# Patient Record
Sex: MALE | Race: WHITE | NOT HISPANIC OR LATINO | URBAN - METROPOLITAN AREA
[De-identification: names, ages, dates, MRNs, and addresses within clinical notes are randomized per-mention and may not be internally consistent; named-entity substitution may affect disease eponyms.]

---

## 2018-03-17 ENCOUNTER — INPATIENT (INPATIENT)
Facility: HOSPITAL | Age: 73
LOS: 5 days | Discharge: HOME | DRG: 304 | End: 2018-03-23
Attending: INTERNAL MEDICINE | Admitting: HOSPITALIST
Payer: MEDICARE

## 2018-03-17 VITALS
DIASTOLIC BLOOD PRESSURE: 135 MMHG | TEMPERATURE: 99 F | HEART RATE: 94 BPM | OXYGEN SATURATION: 96 % | SYSTOLIC BLOOD PRESSURE: 235 MMHG | RESPIRATION RATE: 18 BRPM

## 2018-03-17 DIAGNOSIS — L97.509 NON-PRESSURE CHRONIC ULCER OF OTHER PART OF UNSPECIFIED FOOT WITH UNSPECIFIED SEVERITY: ICD-10-CM

## 2018-03-17 DIAGNOSIS — I25.10 ATHEROSCLEROTIC HEART DISEASE OF NATIVE CORONARY ARTERY WITHOUT ANGINA PECTORIS: ICD-10-CM

## 2018-03-17 DIAGNOSIS — R65.10 SYSTEMIC INFLAMMATORY RESPONSE SYNDROME (SIRS) OF NON-INFECTIOUS ORIGIN WITHOUT ACUTE ORGAN DYSFUNCTION: ICD-10-CM

## 2018-03-17 DIAGNOSIS — Z29.9 ENCOUNTER FOR PROPHYLACTIC MEASURES, UNSPECIFIED: ICD-10-CM

## 2018-03-17 DIAGNOSIS — Z95.1 PRESENCE OF AORTOCORONARY BYPASS GRAFT: Chronic | ICD-10-CM

## 2018-03-17 DIAGNOSIS — Z98.890 OTHER SPECIFIED POSTPROCEDURAL STATES: Chronic | ICD-10-CM

## 2018-03-17 DIAGNOSIS — I16.0 HYPERTENSIVE URGENCY: ICD-10-CM

## 2018-03-17 DIAGNOSIS — I48.91 UNSPECIFIED ATRIAL FIBRILLATION: ICD-10-CM

## 2018-03-17 DIAGNOSIS — K40.90 UNILATERAL INGUINAL HERNIA, WITHOUT OBSTRUCTION OR GANGRENE, NOT SPECIFIED AS RECURRENT: ICD-10-CM

## 2018-03-17 DIAGNOSIS — G93.40 ENCEPHALOPATHY, UNSPECIFIED: ICD-10-CM

## 2018-03-17 DIAGNOSIS — E27.9 DISORDER OF ADRENAL GLAND, UNSPECIFIED: ICD-10-CM

## 2018-03-17 LAB
APPEARANCE UR: CLEAR — SIGNIFICANT CHANGE UP
APTT BLD: 28.1 SEC — SIGNIFICANT CHANGE UP (ref 27.5–37.4)
BASE EXCESS BLDV CALC-SCNC: -1.5 MMOL/L — SIGNIFICANT CHANGE UP (ref -2–2)
BASOPHILS # BLD AUTO: 0 K/UL — SIGNIFICANT CHANGE UP (ref 0–0.2)
BASOPHILS NFR BLD AUTO: 0 % — SIGNIFICANT CHANGE UP (ref 0–2)
BILIRUB UR-MCNC: NEGATIVE — SIGNIFICANT CHANGE UP
CA-I SERPL-SCNC: 1.13 MMOL/L — SIGNIFICANT CHANGE UP (ref 1.12–1.3)
CHLORIDE BLDV-SCNC: 106 MMOL/L — SIGNIFICANT CHANGE UP (ref 96–108)
CK MB CFR SERPL CALC: 2.9 NG/ML — SIGNIFICANT CHANGE UP (ref 0–6.7)
CO2 BLDV-SCNC: 23 MMOL/L — SIGNIFICANT CHANGE UP (ref 22–30)
COLOR SPEC: SIGNIFICANT CHANGE UP
DIFF PNL FLD: NEGATIVE — SIGNIFICANT CHANGE UP
EOSINOPHIL # BLD AUTO: 0 K/UL — SIGNIFICANT CHANGE UP (ref 0–0.5)
EOSINOPHIL NFR BLD AUTO: 0.2 % — SIGNIFICANT CHANGE UP (ref 0–6)
GAS PNL BLDV: 140 MMOL/L — SIGNIFICANT CHANGE UP (ref 136–145)
GAS PNL BLDV: SIGNIFICANT CHANGE UP
GLUCOSE BLDV-MCNC: 125 MG/DL — HIGH (ref 70–99)
GLUCOSE UR QL: NEGATIVE — SIGNIFICANT CHANGE UP
HCO3 BLDV-SCNC: 22 MMOL/L — SIGNIFICANT CHANGE UP (ref 21–29)
HCT VFR BLD CALC: 45.7 % — SIGNIFICANT CHANGE UP (ref 39–50)
HCT VFR BLDA CALC: 44 % — SIGNIFICANT CHANGE UP (ref 39–50)
HGB BLD CALC-MCNC: 14.2 G/DL — SIGNIFICANT CHANGE UP (ref 13–17)
HGB BLD-MCNC: 16 G/DL — SIGNIFICANT CHANGE UP (ref 13–17)
INR BLD: 1.05 RATIO — SIGNIFICANT CHANGE UP (ref 0.88–1.16)
KETONES UR-MCNC: NEGATIVE — SIGNIFICANT CHANGE UP
LACTATE BLDV-MCNC: 4.6 MMOL/L — CRITICAL HIGH (ref 0.7–2)
LEUKOCYTE ESTERASE UR-ACNC: ABNORMAL
LYMPHOCYTES # BLD AUTO: 0.6 K/UL — LOW (ref 1–3.3)
LYMPHOCYTES # BLD AUTO: 7.2 % — LOW (ref 13–44)
MCHC RBC-ENTMCNC: 31.6 PG — SIGNIFICANT CHANGE UP (ref 27–34)
MCHC RBC-ENTMCNC: 35 GM/DL — SIGNIFICANT CHANGE UP (ref 32–36)
MCV RBC AUTO: 90.4 FL — SIGNIFICANT CHANGE UP (ref 80–100)
MONOCYTES # BLD AUTO: 0.2 K/UL — SIGNIFICANT CHANGE UP (ref 0–0.9)
MONOCYTES NFR BLD AUTO: 3.1 % — SIGNIFICANT CHANGE UP (ref 2–14)
NEUTROPHILS # BLD AUTO: 7.3 K/UL — SIGNIFICANT CHANGE UP (ref 1.8–7.4)
NEUTROPHILS NFR BLD AUTO: 89.5 % — HIGH (ref 43–77)
NITRITE UR-MCNC: NEGATIVE — SIGNIFICANT CHANGE UP
PCO2 BLDV: 36 MMHG — SIGNIFICANT CHANGE UP (ref 35–50)
PH BLDV: 7.4 — SIGNIFICANT CHANGE UP (ref 7.35–7.45)
PH UR: 6.5 — SIGNIFICANT CHANGE UP (ref 5–8)
PLATELET # BLD AUTO: 153 K/UL — SIGNIFICANT CHANGE UP (ref 150–400)
PO2 BLDV: 66 MMHG — HIGH (ref 25–45)
POTASSIUM BLDV-SCNC: 3.9 MMOL/L — SIGNIFICANT CHANGE UP (ref 3.5–5)
PROT UR-MCNC: 30 MG/DL
PROTHROM AB SERPL-ACNC: 11.3 SEC — SIGNIFICANT CHANGE UP (ref 9.8–12.7)
RAPID RVP RESULT: SIGNIFICANT CHANGE UP
RBC # BLD: 5.06 M/UL — SIGNIFICANT CHANGE UP (ref 4.2–5.8)
RBC # FLD: 13.7 % — SIGNIFICANT CHANGE UP (ref 10.3–14.5)
SAO2 % BLDV: 93 % — HIGH (ref 67–88)
SP GR SPEC: 1.01 — SIGNIFICANT CHANGE UP (ref 1.01–1.02)
TROPONIN T SERPL-MCNC: <0.01 NG/ML — SIGNIFICANT CHANGE UP (ref 0–0.06)
UROBILINOGEN FLD QL: NEGATIVE — SIGNIFICANT CHANGE UP
WBC # BLD: 8.2 K/UL — SIGNIFICANT CHANGE UP (ref 3.8–10.5)
WBC # FLD AUTO: 8.2 K/UL — SIGNIFICANT CHANGE UP (ref 3.8–10.5)

## 2018-03-17 PROCEDURE — 71045 X-RAY EXAM CHEST 1 VIEW: CPT | Mod: 26

## 2018-03-17 PROCEDURE — 74177 CT ABD & PELVIS W/CONTRAST: CPT | Mod: 26

## 2018-03-17 PROCEDURE — 99223 1ST HOSP IP/OBS HIGH 75: CPT | Mod: AI,GC

## 2018-03-17 PROCEDURE — 70450 CT HEAD/BRAIN W/O DYE: CPT | Mod: 26

## 2018-03-17 PROCEDURE — 99291 CRITICAL CARE FIRST HOUR: CPT | Mod: 25,GC

## 2018-03-17 PROCEDURE — 93010 ELECTROCARDIOGRAM REPORT: CPT

## 2018-03-17 RX ORDER — LABETALOL HCL 100 MG
100 TABLET ORAL ONCE
Qty: 0 | Refills: 0 | Status: COMPLETED | OUTPATIENT
Start: 2018-03-17 | End: 2018-03-17

## 2018-03-17 RX ORDER — METOPROLOL TARTRATE 50 MG
25 TABLET ORAL
Qty: 0 | Refills: 0 | Status: DISCONTINUED | OUTPATIENT
Start: 2018-03-17 | End: 2018-03-18

## 2018-03-17 RX ORDER — VANCOMYCIN HCL 1 G
1000 VIAL (EA) INTRAVENOUS EVERY 12 HOURS
Qty: 0 | Refills: 0 | Status: DISCONTINUED | OUTPATIENT
Start: 2018-03-17 | End: 2018-03-19

## 2018-03-17 RX ORDER — LABETALOL HCL 100 MG
10 TABLET ORAL ONCE
Qty: 0 | Refills: 0 | Status: COMPLETED | OUTPATIENT
Start: 2018-03-17 | End: 2018-03-17

## 2018-03-17 RX ORDER — PIPERACILLIN AND TAZOBACTAM 4; .5 G/20ML; G/20ML
3.38 INJECTION, POWDER, LYOPHILIZED, FOR SOLUTION INTRAVENOUS EVERY 8 HOURS
Qty: 0 | Refills: 0 | Status: DISCONTINUED | OUTPATIENT
Start: 2018-03-17 | End: 2018-03-19

## 2018-03-17 RX ORDER — SODIUM CHLORIDE 9 MG/ML
1000 INJECTION INTRAMUSCULAR; INTRAVENOUS; SUBCUTANEOUS ONCE
Qty: 0 | Refills: 0 | Status: COMPLETED | OUTPATIENT
Start: 2018-03-17 | End: 2018-03-17

## 2018-03-17 RX ORDER — SODIUM CHLORIDE 9 MG/ML
1000 INJECTION INTRAMUSCULAR; INTRAVENOUS; SUBCUTANEOUS
Qty: 0 | Refills: 0 | Status: DISCONTINUED | OUTPATIENT
Start: 2018-03-17 | End: 2018-03-18

## 2018-03-17 RX ORDER — ASPIRIN/CALCIUM CARB/MAGNESIUM 324 MG
81 TABLET ORAL DAILY
Qty: 0 | Refills: 0 | Status: DISCONTINUED | OUTPATIENT
Start: 2018-03-17 | End: 2018-03-22

## 2018-03-17 RX ORDER — VANCOMYCIN HCL 1 G
1000 VIAL (EA) INTRAVENOUS ONCE
Qty: 0 | Refills: 0 | Status: COMPLETED | OUTPATIENT
Start: 2018-03-17 | End: 2018-03-17

## 2018-03-17 RX ORDER — PIPERACILLIN AND TAZOBACTAM 4; .5 G/20ML; G/20ML
3.38 INJECTION, POWDER, LYOPHILIZED, FOR SOLUTION INTRAVENOUS ONCE
Qty: 0 | Refills: 0 | Status: COMPLETED | OUTPATIENT
Start: 2018-03-17 | End: 2018-03-17

## 2018-03-17 RX ORDER — HEPARIN SODIUM 5000 [USP'U]/ML
5000 INJECTION INTRAVENOUS; SUBCUTANEOUS EVERY 8 HOURS
Qty: 0 | Refills: 0 | Status: DISCONTINUED | OUTPATIENT
Start: 2018-03-17 | End: 2018-03-20

## 2018-03-17 RX ORDER — THIAMINE MONONITRATE (VIT B1) 100 MG
100 TABLET ORAL ONCE
Qty: 0 | Refills: 0 | Status: COMPLETED | OUTPATIENT
Start: 2018-03-17 | End: 2018-03-17

## 2018-03-17 RX ORDER — ATORVASTATIN CALCIUM 80 MG/1
40 TABLET, FILM COATED ORAL AT BEDTIME
Qty: 0 | Refills: 0 | Status: DISCONTINUED | OUTPATIENT
Start: 2018-03-17 | End: 2018-03-23

## 2018-03-17 RX ORDER — HYDRALAZINE HCL 50 MG
10 TABLET ORAL EVERY 8 HOURS
Qty: 0 | Refills: 0 | Status: DISCONTINUED | OUTPATIENT
Start: 2018-03-17 | End: 2018-03-18

## 2018-03-17 RX ADMIN — SODIUM CHLORIDE 1000 MILLILITER(S): 9 INJECTION INTRAMUSCULAR; INTRAVENOUS; SUBCUTANEOUS at 17:53

## 2018-03-17 RX ADMIN — Medication 10 MILLIGRAM(S): at 20:02

## 2018-03-17 RX ADMIN — Medication 10 MILLIGRAM(S): at 18:26

## 2018-03-17 RX ADMIN — Medication 100 MILLIGRAM(S): at 20:02

## 2018-03-17 RX ADMIN — Medication 10 MILLIGRAM(S): at 12:59

## 2018-03-17 RX ADMIN — Medication 250 MILLIGRAM(S): at 18:05

## 2018-03-17 RX ADMIN — PIPERACILLIN AND TAZOBACTAM 200 GRAM(S): 4; .5 INJECTION, POWDER, LYOPHILIZED, FOR SOLUTION INTRAVENOUS at 17:00

## 2018-03-17 RX ADMIN — Medication 100 MILLIGRAM(S): at 22:22

## 2018-03-17 RX ADMIN — SODIUM CHLORIDE 1000 MILLILITER(S): 9 INJECTION INTRAMUSCULAR; INTRAVENOUS; SUBCUTANEOUS at 15:02

## 2018-03-17 RX ADMIN — Medication 10 MILLIGRAM(S): at 16:03

## 2018-03-17 NOTE — H&P ADULT - PROBLEM SELECTOR PLAN 5
--h/o CAD/CABG  --will restart aspirin 81mg (only one tab, not two)  --start statin. Patient used to take statin but he said he could not afford it so stopped.

## 2018-03-17 NOTE — H&P ADULT - PMH
Essential hypertension CAD (coronary artery disease)  s/p CABG  Cataract  L eye  Essential hypertension    Hyperlipemia    Inguinal hernia

## 2018-03-17 NOTE — ED PROVIDER NOTE - MEDICAL DECISION MAKING DETAILS
72 year old male with pmhx of Hypertension presents with left sided weakness. Plan: 10 of Labetalol, STAT EKG, CT of head, labs including: cardiac enzymes, CBC, CMP, coags, Chest XR, VBG with lactate. Hypertensive emergency vs. , neurology consulted, patient's  Hypertension resolved with 10 of Labetalol.

## 2018-03-17 NOTE — H&P ADULT - PROBLEM SELECTOR PLAN 7
--patient needs podiatry evaluation in the morning  --will check Xrays of foot for gross signs of osteomyelitis, but low suspicion.

## 2018-03-17 NOTE — H&P ADULT - HISTORY OF PRESENT ILLNESS
72M PMH HTN, CAD/CABG presents with left sided weakness.     He reports feeling unsteady on his feet and disoriented as well as slightly confused. 72M PMH HTN, HLD, CAD/CABG (10 years ago, no stents), L eye cataract presents with left sided weakness and "disorientation." States he woke up and felt disoriented and he didn't understand where he was. Could not accomplish simple tasks. He noticed a difference in strength between his arms/legs, with the left side being perhaps slightly weaker. Called the ambulance himself. He reports feeling unsteady on his feet as well as slightly confused. Patient is somewhat of a poor historian. Denies chest pain, SOB, NVD, abdominal pain, URI symptoms, fevers/chills at home, cough, dizziness, dysuria, frequency, LE edema. He has chronic wounds of b/l great toes which he has been taping for two years. His scrotum is significantly enlarged due to a large L inguinal hernia.    In ED, BP was very elevated to 235/135, -120 afib RVR, Temp 100.9 rectal, RR 16 O2sat 100% on RA. He was given labetalol 10mg X4 IVP, labetalol 100mg oral, 2L bolus. He had elevated lactate to 4.4 which did not improve with IVF.

## 2018-03-17 NOTE — H&P ADULT - NSHPPHYSICALEXAM_GEN_ALL_CORE
Vital Signs Last 24 Hrs  T(C): 37.1 (03-17-18 @ 19:31)  T(F): 98.8 (03-17-18 @ 19:31), Max: 100.9 (03-17-18 @ 13:40)  HR: 86 (03-17-18 @ 19:31) (86 - 103)  BP: 175/109 (03-17-18 @ 20:12)  BP(mean): --  RR: 17 (03-17-18 @ 19:31) (16 - 18)  SpO2: 100% (03-17-18 @ 19:31) (96% - 100%)  Wt(kg): --    PHYSICAL EXAM:  GENERAL: NAD, well-developed  HEAD:  Atraumatic, Normocephalic  EYES: EOMI, PERRLA, conjunctiva and sclera clear  NECK: Supple, No JVD  CHEST/LUNG: Clear to auscultation bilaterally; No wheeze  HEART: Regular rate and rhythm; No murmurs, rubs, or gallops  ABDOMEN: Soft, Nontender, Nondistended; Bowel sounds present  EXTREMITIES:  2+ Peripheral Pulses, No clubbing, cyanosis, or edema  PSYCH: AAOx3  NEUROLOGY: non-focal  SKIN: No rashes or lesions Vital Signs Last 24 Hrs  T(C): 37.1 (03-17-18 @ 19:31)  T(F): 98.8 (03-17-18 @ 19:31), Max: 100.9 (03-17-18 @ 13:40)  HR: 86 (03-17-18 @ 19:31) (86 - 103)  BP: 175/109 (03-17-18 @ 20:12)  BP(mean): --  RR: 17 (03-17-18 @ 19:31) (16 - 18)  SpO2: 100% (03-17-18 @ 19:31) (96% - 100%)  Wt(kg): --    PHYSICAL EXAM:  GENERAL: NAD, well-developed, disheveled with poor personal hygiene   HEAD:  Atraumatic, Normocephalic, dry mucous membranes  EYES: EOMI, PERRLA, conjunctiva and sclera clear  NECK: Supple, No JVD  CHEST/LUNG: Clear to auscultation bilaterally; No wheeze  HEART: irregular rate and rhythm; No murmurs, rubs, or gallops  ABDOMEN: Soft, Nontender, Nondistended; Bowel sounds present  EXTREMITIES:  2+ Peripheral Pulses, No clubbing, cyanosis, or edema  : very large L inguinal hernia and scrotum, anterior scrotal skin slightly erythematous but not warm  PSYCH: AAOx1-2, unclear as to exact date, unclear of location  NEUROLOGY: non-focal, equal strength both sides  SKIN: Very dry skin, 2 great toes with tape on them, bloodied, non-tender Vital Signs Last 24 Hrs  T(C): 37.1 (03-17-18 @ 19:31)  T(F): 98.8 (03-17-18 @ 19:31), Max: 100.9 (03-17-18 @ 13:40)  HR: 86 (03-17-18 @ 19:31) (86 - 103)  BP: 175/109 (03-17-18 @ 20:12)  BP(mean): --  RR: 17 (03-17-18 @ 19:31) (16 - 18)  SpO2: 100% (03-17-18 @ 19:31) (96% - 100%)  Wt(kg): --    PHYSICAL EXAM:  GENERAL: NAD, well-developed, disheveled with poor personal hygiene   HEAD:  Atraumatic, Normocephalic, dry mucous membranes  EYES: EOMI, PERRLA, conjunctiva and sclera clear  NECK: Supple, No JVD  CHEST/LUNG: Clear to auscultation bilaterally; No wheeze  HEART: irregular rate and rhythm; No murmurs, rubs, or gallops  ABDOMEN: Soft, Nontender, Nondistended; Bowel sounds present  EXTREMITIES:  2+ Peripheral Pulses, No clubbing, cyanosis, or edema  : very large L inguinal hernia and scrotum, anterior scrotal skin slightly erythematous but not warm  PSYCH: AAOx2-3, unclear as to exact date  NEUROLOGY: CNs grossly intact, equal strength both sides, dysdiadochocinesia  SKIN: Very dry skin, 2 great toes with tape on them, bloodied, non-tender

## 2018-03-17 NOTE — CONSULT NOTE ADULT - ASSESSMENT
72 year old male with pmhx of Hypertension, CABG on aspirin presents with left sided weakness this morning. Per patient, feels very unsteady/disorientated and slightly confused. Pt reports he is at baseline except he feels "off" and confused. Denies headache, visual complaints. He didn't know what to do with the urinal, which is not normal for him. Neuro exam is remarkable for b/l FNF past pointing and decreased visual acuity in the left eye 2/2 cataracts. BP in the 235/135 in ED. Ct head negative for any acute pathology    Impression - Confusion 2/2 Hypertensive urgency with possible PRES    Reccs:  Medical eval for hypertensive urgency  Infectious workup as pt is febrile  MRI brain  MRA head without zay and neck with zay 72 year old male with pmhx of Hypertension, CABG on aspirin presents with left sided weakness this morning. Per patient, feels very unsteady/disorientated and slightly confused. Pt reports he is at baseline except he feels "off" and confused. Denies headache, visual complaints. He didn't know what to do with the urinal, which is not normal for him. Neuro exam is remarkable for b/l FNF past pointing and decreased visual acuity in the left eye 2/2 cataracts. BP in the 235/135 in ED. Ct head negative for any acute pathology    Impression - Confusion 2/2 Hypertensive urgency with possible PRES vs metabolic encephalopathy    Reccs:  Medical eval for hypertensive urgency  Infectious workup as pt is febrile  MRI brain  MRA head without zay and neck with zay

## 2018-03-17 NOTE — ED PROVIDER NOTE - OBJECTIVE STATEMENT
72 year old male with pmhx of Hypertension, CABG on aspirin presents with left sided weakness this morning. Per patient, feels very unsteady/disorientated and slightly confused. Patient notes blood pressure is elevated, 235/135 in ED. NKDA.

## 2018-03-17 NOTE — ED PROVIDER NOTE - ATTENDING CONTRIBUTION TO CARE
Attending MD Moseley.  Agree with above.  Pt is a confused 72 yr old male who endorses hx of HTN, CABG on ASA who presents to ED with complaint of waking this morning with weakness on L side this morning.  Pt states that he feels very unsteady/disoriented and slightly confused.  Pt’s BP is markedly elevated on arrival.  NO focal deficits but ataxia on ambulation.  Pt incontinent of urine in room while trying to use the urinal.  BP managed emergently and pt expedited to CT for hemorrhagic CVA vs. PRESS. Attending MD Moseley.  Agree with above.  Pt is a confused 72 yr old male who endorses hx of HTN, CABG on ASA who presents to ED with complaint of waking this morning with weakness on L side this morning.  Pt states that he feels very unsteady/disoriented and slightly confused.  Pt’s BP is markedly elevated on arrival.  NO focal deficits but ataxia on ambulation.  Pt incontinent of urine in room while trying to use the urinal.  BP managed emergently and pt expedited to CT for hemorrhagic CVA vs. PRESS.  Intracranial imaging non-actionable.  BP improved.  Pt is encephalopathic vs. demented though he was afebrile on arrival.  Exam concerning for marked scrotal hernia.  CT abd/pelvis ordered for further eval of mental status, encephalopathy and marked scrotal swelling vs. hernia.  Stable at time of signout having just become febrile will administer broad spectrum abxs for unclear pathogen.

## 2018-03-17 NOTE — ED PROVIDER NOTE - CARE PLAN
Principal Discharge DX:	Hypertensive urgency  Secondary Diagnosis:	Fever  Secondary Diagnosis:	Acute encephalopathy

## 2018-03-17 NOTE — H&P ADULT - PROBLEM SELECTOR PLAN 3
--was RVR up to 120s in the ED, received labetalol and responded  --unknown chronicity of afib, patient unaware of ever carrying this diagnosis  --no urgent need to anticoagulate, although patient will likely need A/C for increased stroke risk (CHADs2-VASC = 3)  --will restart home metoprolol for rate control --was RVR up to 120s in the ED, received labetalol and responded  --unknown chronicity of afib, patient unaware of ever carrying this diagnosis  --no urgent need to anticoagulate, although patient will likely need A/C for increased stroke risk (CHADs2-VASC = 3)  --will restart home metoprolol for rate control  --check TTE for any structural changes.

## 2018-03-17 NOTE — H&P ADULT - ATTENDING COMMENTS
Attending addendum:  Patient seen and examined, I agree with the above assessment and plan with any changes indicated below.    72M PMH HTN, HLD, CAD/CABG (10 years ago, no stents), L eye cataract presents with left sided weakness and "disorientation." Patient has difficulty recalling events from earlier this morning and is unable to completely describe them. He states that he currently lives at home and is able to manage all of his ADL's and IADLs independently. Upon waking this morning, he repots that he felt slightly weak on one side and also felt as if he were not himself. Does not recall how long this lasted but came to the hospital. Otherwise, denies any other symptoms. No fevers, chills, headache, shortness of breath, abdominal pain, nausea, vomiting, diarrhea, constipation, or urinary changes. No new pain from hernia sit. No neck stiffness, photophobia. Reports being compliant with medications, but unable to see a physician for some time due to issues with insurance.     In the ED, patient was febrile to 100.9 x1, significantly hypertensive to the 230s and noted to be in atrial fibrillation with RVR.  Labs notable mostly for elevated lactate to 4.4 -> 4.6. CT scan of abdomen showed hernia without evidence of incarceration. CT head negative, CXR negative, UA non-infectious. Received labetalol with improvement in BP to the 170s along with reduction in HR. Additionally, received 2L fluids and Vanc/Zosyn.    In discussion with patient, he still feels mildly confused but otherwise denies any weakness or other symptoms. Exam notable for large, inguinal hernia with minimal tenderness/pain. Toes with bilateral bandages self-applied, signs of prior bleeding. Under bandages, no active bleeding, ulcers, or purulence however L toenail completely eroded and R toenail partially eroded. Has not seen a physician for this previously. No other notable findings on exam.    Patient was seen by Neurology who recommended further work up with MRI. Surgery evaluated in ED, no evidence of incarceration. Actively having BP lowered. Given confusion and age, will cover broadly with antibiotics at this time though do not suspect active infection and may be able to be discontinued in AM if patient is doing well. will w/u possible osteo, though also unlikely with exam. XR, ESR/CRP, Podiatry consult in AM. Unclear baseline mental status as no prior records here. further workup for Afib, ongoing discussion for anticoagulation. Social work consult due to concern for self-care at home. Remainder of plan as above.

## 2018-03-17 NOTE — H&P ADULT - PROBLEM SELECTOR PLAN 4
--fever by rectal temp and tachycardic in the ED  --patient has no complaints that would help to localize infection  --UA neg, UCx, BCx x 2, RVP sent in the ED  --can start with Xrays of feet to look for gross osteomyelitis, but low suspicion for this  --lactate elevated to 4, not improved initially with IVF. Will continue some hydration as he appears hypovolemic.   --will continue with broad spectrum antibiotics for now as clinical picture is unclear but patient is encephalopathic with elevated lactate.

## 2018-03-17 NOTE — H&P ADULT - FAMILY HISTORY
No pertinent family history in first degree relatives Sibling  Still living? Unknown  Family history of liver cancer, Age at diagnosis: Age Unknown

## 2018-03-17 NOTE — H&P ADULT - PSH
No significant past surgical history H/O inguinal hernia repair  R sided  History of appendectomy    S/P CABG (coronary artery bypass graft)

## 2018-03-17 NOTE — H&P ADULT - ASSESSMENT
72M PMH HTN, HLD, CAD/CABG (10 years ago, no stents), L eye cataract presents with left sided weakness and "disorientation," found to have HTN urgency, fever/SIRS positive without a clear source, new onset afib, and unexplained elevated lactate.

## 2018-03-17 NOTE — CONSULT NOTE ADULT - ATTENDING COMMENTS
Patient awake and alert this morning, BP normalized.  He is oriented, non-focal neuro exam.  CT head shows no lucency in the posterior cerebrum.  PRES not likely, no need for MRI brain.

## 2018-03-17 NOTE — ED ADULT NURSE NOTE - OBJECTIVE STATEMENT
72 year old male a/ox3 brought in by ambulance with left sided weakness since waking up this morning at 0750am and unable to remember certain things such as SSN or names of medications. no slurred speech noted, no facial droop, bilateral upper and lower ext strength and sensation noted. bp noted elevated patient states he has not checked his BP 'In a long time" has not seen PMD x 1 year. no sob/dyspnea/cp respirations even unlabored abd soft nondistended minimal bowel sounds noted. patient with left sided large hernia to testicle that is resting on lower abd. patient states he has had this hernia "for a long time' denies any pain or discomfort no pain on palpation. able to urinate and move bowels without any difficulty.

## 2018-03-17 NOTE — H&P ADULT - PROBLEM SELECTOR PLAN 9
--VTE PPX with HSQ for now until can have proper discussion regarding anticoagulation when patient's mentation improves.

## 2018-03-17 NOTE — ED PROVIDER NOTE - MUSCULOSKELETAL MINIMAL EXAM
No marked swelling, bialteral feet dirty with chronic wounds of bilateral hallus longus distally, no sig edema/erythema/inc warmth

## 2018-03-17 NOTE — H&P ADULT - PROBLEM SELECTOR PLAN 1
--elevated to 235/135 on admission, now ranging 170s-190s systolic.   --will treat to goal around 160s-170s (reduction in about 25%)  --restart home metoprolol. Hold HCTZ as giving fluids. Will start oral hydralazine for now as short half life in the hospital, but plan to transition to better long term agents such as ACE-I/ARB or CCB when BP better controlled.  --monitor on telemetry  --can trend one more set of cardiac enzymes.

## 2018-03-17 NOTE — ED PROVIDER NOTE - CHPI ED SYMPTOMS NEG
no loss of consciousness/no change in level of consciousness/no blurred vision/no vomiting/no confusion

## 2018-03-17 NOTE — ED PROVIDER NOTE - PROGRESS NOTE DETAILS
Surgery consulted after discussion with internal medical hospitalist attending. Discussed and expressed clinical concern for incarcerated vs. strangulated hernia in the setting of a lactate of 4.4, febrile, very large left inguinal hernia extending into scrotum with significantly enlarged scrotum, patient with AMS. My clinical suspicion for incarceration vs. strangulation is low overall given only mildly tender scrotum on exam, no overlying skin changes, pt ambulatory, but cannot exclude. Attending MD Baker: surgery team has seen patient, they do not feel patient has e/o incarceration or strangulation of hernia seen on CT, patient cleared for medical admission for encephalopathy, HTN urgency and fever, s/p broad spec abx in ED Attending MD Baker: /120s, keen mental status, no current symptoms so malignant HTN not likely, HTN urgency more likely. Will give repeat IV labetalol and give PO labetalol for more lasting BP control. Dr. Gan has seen patient and now states he is not able to accept admission and requests admission to night hospitalist. Attending MD Baker: BP improved to 170/100 after IV labetalol, lactate still elevated to 4.6, patient with benign abdomen so bowel ischemia unlikely, no longer febrile and remains HYPERTENSIVE so sepsis driven lactate elevation unlikely. Will continue to trend.

## 2018-03-17 NOTE — H&P ADULT - NSHPLABSRESULTS_GEN_ALL_CORE
LABS:                 16.0   8.2   )-----------( 153      ( 17 Mar 2018 12:58 )             45.7     141  |  99  |  25<H>  ----------------------------<  157<H>  3.9   |  25  |  1.21    Ca    9.6      17 Mar 2018 12:58    TPro  9.1<H>  /  Alb  4.7  /  TBili  0.4  /  DBili  x   /  AST  20  /  ALT  13  /  AlkPhos  91  03-17    PT/INR - ( 17 Mar 2018 12:58 )   PT: 11.3 sec;   INR: 1.05 ratio    PTT - ( 17 Mar 2018 12:58 )  PTT:28.1 sec  CARDIAC MARKERS ( 17 Mar 2018 12:58 )  x     / <0.01 ng/mL / x     / x     / 2.9 ng/mL    Urinalysis Basic - ( 17 Mar 2018 16:43 )    Color: PL Yellow / Appearance: Clear / S.015 / pH: x  Gluc: x / Ketone: Negative  / Bili: Negative / Urobili: Negative   Blood: x / Protein: 30 mg/dL / Nitrite: Negative   Leuk Esterase: Trace / RBC: 0-2 /HPF / WBC 2-5 /HPF   Sq Epi: x / Non Sq Epi: Occasional /HPF / Bacteria: x    RADIOLOGY & ADDITIONAL TESTS:  Imaging Personally Reviewed:  CT A/P:  NTERPRETATION:  Clinical information: Evaluate for hernia.    CT scan of the abdomen is obtained following administration of both oral   and intravenous contrast. Approximately 90 cc of Omnipaque 350 was   administered and 10 cc was discarded.    Heart is enlarged in size. Calcification the coronary arteries is noted.     Liver, spleen, and pancreas are unremarkable. Stone is noted within the   gallbladder. Small nodules are noted within both adrenal glands which are   indeterminate based on this exam.     Both kidneys enhance with contrast. No hydronephrosis is noted. Small   low-attenuation lesion in the left kidney is too small to be adequately   characterized on this exam.    No retroperitoneal adenopathy is noted. Infrarenal abdominal aortic   aneurysm measuring 3.7 cm is noted.     Bowel loops are normal in caliber. A very large left inguinal hernia   containing both small and large bowel is noted. It is incompletely imaged   on this exam. Stool is noted within the large bowel.    Examination of the pelvis demonstrates an enlarged prostate gland.     Visualized osseous structures are within normal limits.    Impression: Very large left inguinal hernia containing both small and   large bowel loops. The hernia is incompletely imaged on this exam.    CT Head:   FINDINGS:    There is no acute intracranial hemorrhage, mass effect, midline shift,   hydrocephalus or acute territorial infarction.    There is no displaced calvarial fracture. The visualized paranasal   sinuses and mastoid air cells are well aerated.     IMPRESSION:     No acute intracranial hemorrhage, mass effect, or acute territorial   infarction.    Consultant(s) Notes Reviewed:  Neuro, surgery    Care Discussed with Consultants/Other Providers: LABS personally reviewed:                 16.0   8.2   )-----------( 153      ( 17 Mar 2018 12:58 )             45.7     141  |  99  |  25<H>  ----------------------------<  157<H>  3.9   |  25  |  1.21    Ca    9.6      17 Mar 2018 12:58    TPro  9.1<H>  /  Alb  4.7  /  TBili  0.4  /  DBili  x   /  AST  20  /  ALT  13  /  AlkPhos  91  03-17    PT/INR - ( 17 Mar 2018 12:58 )   PT: 11.3 sec;   INR: 1.05 ratio    PTT - ( 17 Mar 2018 12:58 )  PTT:28.1 sec  CARDIAC MARKERS ( 17 Mar 2018 12:58 )  x     / <0.01 ng/mL / x     / x     / 2.9 ng/mL    Urinalysis Basic - ( 17 Mar 2018 16:43 )    Color: PL Yellow / Appearance: Clear / S.015 / pH: x  Gluc: x / Ketone: Negative  / Bili: Negative / Urobili: Negative   Blood: x / Protein: 30 mg/dL / Nitrite: Negative   Leuk Esterase: Trace / RBC: 0-2 /HPF / WBC 2-5 /HPF   Sq Epi: x / Non Sq Epi: Occasional /HPF / Bacteria: x    lactate 4.6    EKG: atrial fibrillation, flat TW in III but with artifact, , no baseline to compare    RADIOLOGY & ADDITIONAL TESTS:  Imaging Personally Reviewed:  CT A/P:  NTERPRETATION:  Clinical information: Evaluate for hernia.    CT scan of the abdomen is obtained following administration of both oral   and intravenous contrast. Approximately 90 cc of Omnipaque 350 was   administered and 10 cc was discarded.    Heart is enlarged in size. Calcification the coronary arteries is noted.     Liver, spleen, and pancreas are unremarkable. Stone is noted within the   gallbladder. Small nodules are noted within both adrenal glands which are   indeterminate based on this exam.     Both kidneys enhance with contrast. No hydronephrosis is noted. Small   low-attenuation lesion in the left kidney is too small to be adequately   characterized on this exam.    No retroperitoneal adenopathy is noted. Infrarenal abdominal aortic   aneurysm measuring 3.7 cm is noted.     Bowel loops are normal in caliber. A very large left inguinal hernia   containing both small and large bowel is noted. It is incompletely imaged   on this exam. Stool is noted within the large bowel.    Examination of the pelvis demonstrates an enlarged prostate gland.     Visualized osseous structures are within normal limits.    Impression: Very large left inguinal hernia containing both small and   large bowel loops. The hernia is incompletely imaged on this exam.    CT Head:   FINDINGS:    There is no acute intracranial hemorrhage, mass effect, midline shift,   hydrocephalus or acute territorial infarction.    There is no displaced calvarial fracture. The visualized paranasal   sinuses and mastoid air cells are well aerated.     IMPRESSION:     No acute intracranial hemorrhage, mass effect, or acute territorial   infarction.    Consultant(s) Notes Reviewed:  Neuro, surgery    Care Discussed with Consultants/Other Providers:

## 2018-03-17 NOTE — H&P ADULT - PROBLEM SELECTOR PLAN 2
--metabolic vs 2/2 HTN urgency vs neuro etiology. Patient with SIRS+ and possible infection. Also being evaluated for PRES.   --Appreciate neuro imput. MRI/MRI ordered  --will cover with broad spectrum antibiotics at this time  --fall precautions

## 2018-03-17 NOTE — H&P ADULT - NSHPREVIEWOFSYSTEMS_GEN_ALL_CORE
Review of Systems:   CONSTITUTIONAL: No fever, weight loss, or fatigue  EYES: No eye pain, visual disturbances, or discharge  ENMT:  No difficulty hearing, tinnitus, vertigo; No sinus or throat pain  NECK: No pain or stiffness  BREASTS: No pain, masses, or nipple discharge  RESPIRATORY: No cough, wheezing, chills or hemoptysis; No shortness of breath  CARDIOVASCULAR: No chest pain, palpitations, dizziness, or leg swelling  GASTROINTESTINAL: No abdominal or epigastric pain. No nausea, vomiting, or hematemesis; No diarrhea or constipation. No melena or hematochezia.  GENITOURINARY: No dysuria, frequency, hematuria, or incontinence  NEUROLOGICAL: No headaches, memory loss, loss of strength, numbness, or tremors  SKIN: No itching, burning, rashes, or lesions   LYMPH NODES: No enlarged glands  ENDOCRINE: No heat or cold intolerance; No hair loss  MUSCULOSKELETAL: No joint pain or swelling; No muscle, back, or extremity pain  PSYCHIATRIC: No depression, anxiety, mood swings, or difficulty sleeping  HEME/LYMPH: No easy bruising, or bleeding gums  ALLERY AND IMMUNOLOGIC: No hives or eczema  [ ] all other systems negative or as per HPI Review of Systems:   CONSTITUTIONAL: No fever, weight loss, or fatigue  EYES: No eye pain, visual disturbances, or discharge  ENMT:  No difficulty hearing, tinnitus, vertigo; No sinus or throat pain  NECK: No pain or stiffness  BREASTS: No pain, masses, or nipple discharge  RESPIRATORY: No cough, wheezing, chills or hemoptysis; No shortness of breath  CARDIOVASCULAR: No chest pain, palpitations, dizziness, or leg swelling  GASTROINTESTINAL: No abdominal or epigastric pain. No nausea, vomiting, or hematemesis; No diarrhea or constipation. No melena or hematochezia.  GENITOURINARY: No dysuria, frequency, hematuria, or incontinence  NEUROLOGICAL: +CONFUSION, WEAKNESS; No headaches, memory loss, numbness, or tremors  SKIN: No itching, burning, rashes, or lesions   LYMPH NODES: No enlarged glands  ENDOCRINE: No heat or cold intolerance; No hair loss  MUSCULOSKELETAL: No joint pain or swelling; No muscle, back, or extremity pain  PSYCHIATRIC: No depression, anxiety, mood swings, or difficulty sleeping  HEME/LYMPH: No easy bruising, or bleeding gums  ALLERY AND IMMUNOLOGIC: No hives or eczema  [ ] all other systems negative or as per HPI

## 2018-03-17 NOTE — CONSULT NOTE ADULT - SUBJECTIVE AND OBJECTIVE BOX
Neurology Consult Note Neurology Consult Note    72 year old male with pmhx of Hypertension, CABG on aspirin presents with left sided weakness this morning. Per patient, feels very unsteady/disorientated and slightly confused. Patient notes blood pressure is elevated, 235/135 in ED.     Neurology on board for possible PRES    PMhx - no EMR chart and pt reports he has HTN and no other medical history. ED notes documents pt has as history of CABG    Social history - previous 2 PPD smoker, quit 13 years ago, denies other toxic habits    Home medications - On two antihypertensive medications, he does not know the name    Vital Signs Last 24 Hrs  T(C): 38.3 (17 Mar 2018 13:40), Max: 38.3 (17 Mar 2018 13:40)  T(F): 100.9 (17 Mar 2018 13:40), Max: 100.9 (17 Mar 2018 13:40)  HR: 98 (17 Mar 2018 13:40) (94 - 103)  BP: 193/90 (17 Mar 2018 13:40) (151/90 - 235/135)  BP(mean): --  RR: 18 (17 Mar 2018 13:40) (18 - 18)  SpO2: 100% (17 Mar 2018 13:40) (96% - 100%)    Neuro Exam:  MS - AAOX3, naming and repition in tact, oriented to current president  CNS - EOMI, Face symmetric, decreased visual loss in the left eye (baseline) sensation in tact, no dysarthria  Motor - 5/5 throughout  sensory light touch in tact throughout  Coordination - past point on FNF b/l   Gait - stable, non-ataxic

## 2018-03-17 NOTE — H&P ADULT - PROBLEM SELECTOR PLAN 6
--massive L inguinal hernia, not incarcerated. Reportedly evaluated by surgery and not incarcerated/strangulated. Patient believes he had a BM yesterday, passing gas. --no signs of incarceration on CT abdomen  --continue to monitor

## 2018-03-17 NOTE — ED PROVIDER NOTE - NEUROLOGICAL LEVEL OF CONSCIOUSNESS
Non-focal, responds but not always to correct instruction, verbal, alert, ambulatory with intermittent and inconsistent ataxia

## 2018-03-17 NOTE — H&P ADULT - NSHPSOCIALHISTORY_GEN_ALL_CORE
Former smoker 2PPD, quit 13 years ago, no other toxic habits. Former smoker 2PPD x 40 years, quit 13 years ago, no other toxic habits. He lives alone, drives, typically independent of all ADLs and iADLs as per patient (but personal hygiene is poor). He would want his brother Valeriy to make medical decisions upon his behalf if he were unable to.

## 2018-03-18 LAB
ALBUMIN SERPL ELPH-MCNC: 4.2 G/DL — SIGNIFICANT CHANGE UP (ref 3.3–5)
ALP SERPL-CCNC: 78 U/L — SIGNIFICANT CHANGE UP (ref 40–120)
ALT FLD-CCNC: 10 U/L RC — SIGNIFICANT CHANGE UP (ref 10–45)
ANION GAP SERPL CALC-SCNC: 18 MMOL/L — HIGH (ref 5–17)
APTT BLD: 27.7 SEC — SIGNIFICANT CHANGE UP (ref 27.5–37.4)
AST SERPL-CCNC: 20 U/L — SIGNIFICANT CHANGE UP (ref 10–40)
BILIRUB SERPL-MCNC: 0.6 MG/DL — SIGNIFICANT CHANGE UP (ref 0.2–1.2)
BUN SERPL-MCNC: 16 MG/DL — SIGNIFICANT CHANGE UP (ref 7–23)
CALCIUM SERPL-MCNC: 9.2 MG/DL — SIGNIFICANT CHANGE UP (ref 8.4–10.5)
CHLORIDE SERPL-SCNC: 101 MMOL/L — SIGNIFICANT CHANGE UP (ref 96–108)
CK MB BLD-MCNC: 2 % — SIGNIFICANT CHANGE UP (ref 0–3.5)
CK MB CFR SERPL CALC: 6.5 NG/ML — SIGNIFICANT CHANGE UP (ref 0–6.7)
CK SERPL-CCNC: 332 U/L — HIGH (ref 30–200)
CO2 SERPL-SCNC: 22 MMOL/L — SIGNIFICANT CHANGE UP (ref 22–31)
CREAT SERPL-MCNC: 1.07 MG/DL — SIGNIFICANT CHANGE UP (ref 0.5–1.3)
CRP SERPL-MCNC: 0.4 MG/DL — SIGNIFICANT CHANGE UP (ref 0–0.4)
CULTURE RESULTS: NO GROWTH — SIGNIFICANT CHANGE UP
ERYTHROCYTE [SEDIMENTATION RATE] IN BLOOD: 14 MM/HR — SIGNIFICANT CHANGE UP (ref 0–20)
GLUCOSE SERPL-MCNC: 136 MG/DL — HIGH (ref 70–99)
HBA1C BLD-MCNC: 5.9 % — HIGH (ref 4–5.6)
HCT VFR BLD CALC: 38.8 % — LOW (ref 39–50)
HGB BLD-MCNC: 13.4 G/DL — SIGNIFICANT CHANGE UP (ref 13–17)
INR BLD: 1.04 RATIO — SIGNIFICANT CHANGE UP (ref 0.88–1.16)
LACTATE SERPL-SCNC: 2.9 MMOL/L — HIGH (ref 0.7–2)
MAGNESIUM SERPL-MCNC: 2 MG/DL — SIGNIFICANT CHANGE UP (ref 1.6–2.6)
MCHC RBC-ENTMCNC: 31 PG — SIGNIFICANT CHANGE UP (ref 27–34)
MCHC RBC-ENTMCNC: 34.5 GM/DL — SIGNIFICANT CHANGE UP (ref 32–36)
MCV RBC AUTO: 89.8 FL — SIGNIFICANT CHANGE UP (ref 80–100)
NRBC # BLD: 0 /100 WBCS — SIGNIFICANT CHANGE UP (ref 0–0)
PHOSPHATE SERPL-MCNC: 2.3 MG/DL — LOW (ref 2.5–4.5)
PLATELET # BLD AUTO: 149 K/UL — LOW (ref 150–400)
POTASSIUM SERPL-MCNC: 3.7 MMOL/L — SIGNIFICANT CHANGE UP (ref 3.5–5.3)
POTASSIUM SERPL-SCNC: 3.7 MMOL/L — SIGNIFICANT CHANGE UP (ref 3.5–5.3)
PROT SERPL-MCNC: 7.6 G/DL — SIGNIFICANT CHANGE UP (ref 6–8.3)
PROTHROM AB SERPL-ACNC: 11.8 SEC — SIGNIFICANT CHANGE UP (ref 10–13.1)
RBC # BLD: 4.32 M/UL — SIGNIFICANT CHANGE UP (ref 4.2–5.8)
RBC # FLD: 15 % — HIGH (ref 10.3–14.5)
SODIUM SERPL-SCNC: 141 MMOL/L — SIGNIFICANT CHANGE UP (ref 135–145)
SPECIMEN SOURCE: SIGNIFICANT CHANGE UP
TROPONIN T SERPL-MCNC: <0.01 NG/ML — SIGNIFICANT CHANGE UP (ref 0–0.06)
WBC # BLD: 11.12 K/UL — HIGH (ref 3.8–10.5)
WBC # FLD AUTO: 11.12 K/UL — HIGH (ref 3.8–10.5)

## 2018-03-18 PROCEDURE — 93010 ELECTROCARDIOGRAM REPORT: CPT

## 2018-03-18 PROCEDURE — 99222 1ST HOSP IP/OBS MODERATE 55: CPT

## 2018-03-18 PROCEDURE — 73630 X-RAY EXAM OF FOOT: CPT | Mod: 26,50

## 2018-03-18 RX ORDER — SODIUM CHLORIDE 9 MG/ML
1000 INJECTION INTRAMUSCULAR; INTRAVENOUS; SUBCUTANEOUS
Qty: 0 | Refills: 0 | Status: COMPLETED | OUTPATIENT
Start: 2018-03-18 | End: 2018-03-18

## 2018-03-18 RX ORDER — LABETALOL HCL 100 MG
10 TABLET ORAL ONCE
Qty: 0 | Refills: 0 | Status: COMPLETED | OUTPATIENT
Start: 2018-03-18 | End: 2018-03-18

## 2018-03-18 RX ORDER — INFLUENZA VIRUS VACCINE 15; 15; 15; 15 UG/.5ML; UG/.5ML; UG/.5ML; UG/.5ML
0.5 SUSPENSION INTRAMUSCULAR ONCE
Qty: 0 | Refills: 0 | Status: DISCONTINUED | OUTPATIENT
Start: 2018-03-18 | End: 2018-03-23

## 2018-03-18 RX ORDER — HYDRALAZINE HCL 50 MG
25 TABLET ORAL EVERY 8 HOURS
Qty: 0 | Refills: 0 | Status: DISCONTINUED | OUTPATIENT
Start: 2018-03-18 | End: 2018-03-19

## 2018-03-18 RX ORDER — POTASSIUM CHLORIDE 20 MEQ
40 PACKET (EA) ORAL ONCE
Qty: 0 | Refills: 0 | Status: COMPLETED | OUTPATIENT
Start: 2018-03-18 | End: 2018-03-18

## 2018-03-18 RX ORDER — HYDRALAZINE HCL 50 MG
10 TABLET ORAL ONCE
Qty: 0 | Refills: 0 | Status: COMPLETED | OUTPATIENT
Start: 2018-03-18 | End: 2018-03-18

## 2018-03-18 RX ORDER — METOPROLOL TARTRATE 50 MG
50 TABLET ORAL
Qty: 0 | Refills: 0 | Status: DISCONTINUED | OUTPATIENT
Start: 2018-03-18 | End: 2018-03-20

## 2018-03-18 RX ORDER — SODIUM,POTASSIUM PHOSPHATES 278-250MG
1 POWDER IN PACKET (EA) ORAL
Qty: 0 | Refills: 0 | Status: COMPLETED | OUTPATIENT
Start: 2018-03-18 | End: 2018-03-18

## 2018-03-18 RX ADMIN — PIPERACILLIN AND TAZOBACTAM 25 GRAM(S): 4; .5 INJECTION, POWDER, LYOPHILIZED, FOR SOLUTION INTRAVENOUS at 05:14

## 2018-03-18 RX ADMIN — Medication 250 MILLIGRAM(S): at 17:58

## 2018-03-18 RX ADMIN — SODIUM CHLORIDE 100 MILLILITER(S): 9 INJECTION INTRAMUSCULAR; INTRAVENOUS; SUBCUTANEOUS at 18:00

## 2018-03-18 RX ADMIN — Medication 81 MILLIGRAM(S): at 12:38

## 2018-03-18 RX ADMIN — Medication 50 MILLIGRAM(S): at 18:00

## 2018-03-18 RX ADMIN — Medication 10 MILLIGRAM(S): at 04:28

## 2018-03-18 RX ADMIN — Medication 250 MILLIGRAM(S): at 05:14

## 2018-03-18 RX ADMIN — Medication 25 MILLIGRAM(S): at 22:05

## 2018-03-18 RX ADMIN — Medication 25 MILLIGRAM(S): at 12:38

## 2018-03-18 RX ADMIN — Medication 1 PACKET(S): at 08:22

## 2018-03-18 RX ADMIN — PIPERACILLIN AND TAZOBACTAM 25 GRAM(S): 4; .5 INJECTION, POWDER, LYOPHILIZED, FOR SOLUTION INTRAVENOUS at 22:06

## 2018-03-18 RX ADMIN — Medication 40 MILLIEQUIVALENT(S): at 06:46

## 2018-03-18 RX ADMIN — HEPARIN SODIUM 5000 UNIT(S): 5000 INJECTION INTRAVENOUS; SUBCUTANEOUS at 22:06

## 2018-03-18 RX ADMIN — Medication 50 MILLIGRAM(S): at 05:14

## 2018-03-18 RX ADMIN — HEPARIN SODIUM 5000 UNIT(S): 5000 INJECTION INTRAVENOUS; SUBCUTANEOUS at 05:14

## 2018-03-18 RX ADMIN — Medication 1 PACKET(S): at 22:18

## 2018-03-18 RX ADMIN — Medication 25 MILLIGRAM(S): at 05:27

## 2018-03-18 RX ADMIN — Medication 1 PACKET(S): at 18:00

## 2018-03-18 RX ADMIN — Medication 10 MILLIGRAM(S): at 02:45

## 2018-03-18 RX ADMIN — SODIUM CHLORIDE 100 MILLILITER(S): 9 INJECTION INTRAMUSCULAR; INTRAVENOUS; SUBCUTANEOUS at 04:17

## 2018-03-18 RX ADMIN — SODIUM CHLORIDE 100 MILLILITER(S): 9 INJECTION INTRAMUSCULAR; INTRAVENOUS; SUBCUTANEOUS at 06:49

## 2018-03-18 RX ADMIN — ATORVASTATIN CALCIUM 40 MILLIGRAM(S): 80 TABLET, FILM COATED ORAL at 22:05

## 2018-03-18 RX ADMIN — HEPARIN SODIUM 5000 UNIT(S): 5000 INJECTION INTRAVENOUS; SUBCUTANEOUS at 12:38

## 2018-03-18 RX ADMIN — PIPERACILLIN AND TAZOBACTAM 25 GRAM(S): 4; .5 INJECTION, POWDER, LYOPHILIZED, FOR SOLUTION INTRAVENOUS at 17:57

## 2018-03-18 RX ADMIN — Medication 1 PACKET(S): at 12:39

## 2018-03-18 NOTE — PROGRESS NOTE ADULT - PROBLEM SELECTOR PLAN 3
--was RVR up to 120s in the ED, received labetalol and responded  --unknown chronicity of afib, patient unaware of ever carrying this diagnosis  --no urgent need to anticoagulate, although patient will likely need A/C for increased stroke risk (CHADs2-VASC = 3)  Continue home metoprolol for rate control  --check TTE for any structural changes.

## 2018-03-18 NOTE — PROGRESS NOTE ADULT - SUBJECTIVE AND OBJECTIVE BOX
GENERAL SURGERY DAILY PROGRESS NOTE:     Hospital Day: 2    Objective: Feels well, denies nausea or vomiting. Tolerating diet.  Overnight intermittent chest pain in setting of hypertension, with spontaneous resolution, cardiac work-up negative at this time. Serum lactate normalizing (2.9) after 2.6 L NS.     MEDICATIONS  (STANDING):  aspirin enteric coated 81 milliGRAM(s) Oral daily  atorvastatin 40 milliGRAM(s) Oral at bedtime  heparin  Injectable 5000 Unit(s) SubCutaneous every 8 hours  hydrALAZINE 25 milliGRAM(s) Oral every 8 hours  influenza   Vaccine 0.5 milliLiter(s) IntraMuscular once  metoprolol     tartrate 50 milliGRAM(s) Oral two times a day  piperacillin/tazobactam IVPB. 3.375 Gram(s) IV Intermittent every 8 hours  potassium phosphate / sodium phosphate powder 1 Packet(s) Oral four times a day with meals  sodium chloride 0.9%. 1000 milliLiter(s) (100 mL/Hr) IV Continuous <Continuous>  vancomycin  IVPB 1000 milliGRAM(s) IV Intermittent every 12 hours    MEDICATIONS  (PRN):      Vital Signs Last 24 Hrs  T(C): 36.8 (18 Mar 2018 08:12), Max: 38.3 (17 Mar 2018 13:40)  T(F): 98.3 (18 Mar 2018 08:12), Max: 100.9 (17 Mar 2018 13:40)  HR: 78 (18 Mar 2018 08:12) (78 - 109)  BP: 149/77 (18 Mar 2018 08:12) (140/90 - 235/135)  BP(mean): --  RR: 18 (18 Mar 2018 08:12) (16 - 18)  SpO2: 95% (18 Mar 2018 08:12) (95% - 100%)    I&O's Detail    General: NAD  Neurology: A&Ox3, DUARTE x 4  Respiratory: Nonlabored.   CV: IRRR  Abdominal: Obese, soft, nontender, nondistended, nonreducible, chronic giant left inguinal hernia without evidence of erythema or skin changes.      Daily Height in cm: 172.72 (18 Mar 2018 02:01)      LABS:                        13.4   11.12 )-----------( 149      ( 18 Mar 2018 08:37 )             38.8     03-18    141  |  101  |  16  ----------------------------<  136<H>  3.7   |  22  |  1.07    Ca    9.2      18 Mar 2018 05:30  Phos  2.3     -18  Mg     2.0     -18    TPro  7.6  /  Alb  4.2  /  TBili  0.6  /  DBili  x   /  AST  20  /  ALT  10  /  AlkPhos  78  -18    PT/INR - ( 18 Mar 2018 08:37 )   PT: 11.8 sec;   INR: 1.04 ratio         PTT - ( 18 Mar 2018 08:37 )  PTT:27.7 sec  Urinalysis Basic - ( 17 Mar 2018 16:43 )    Color: PL Yellow / Appearance: Clear / S.015 / pH: x  Gluc: x / Ketone: Negative  / Bili: Negative / Urobili: Negative   Blood: x / Protein: 30 mg/dL / Nitrite: Negative   Leuk Esterase: Trace / RBC: 0-2 /HPF / WBC 2-5 /HPF   Sq Epi: x / Non Sq Epi: Occasional /HPF / Bacteria: x

## 2018-03-18 NOTE — PROGRESS NOTE ADULT - SUBJECTIVE AND OBJECTIVE BOX
Very anxious appearing, does not want to go to MRI quite yet  he denies any cough, fevers, chills, abd pain, N/V, diarrhea  No HA or neck pain  No photophobia    Vital Signs Last 24 Hrs  T(C): 36.8 (18 Mar 2018 08:12), Max: 38.3 (17 Mar 2018 13:40)  T(F): 98.3 (18 Mar 2018 08:12), Max: 100.9 (17 Mar 2018 13:40)  HR: 78 (18 Mar 2018 08:12) (78 - 109)  BP: 149/77 (18 Mar 2018 08:12) (140/90 - 235/135)  BP(mean): --  RR: 18 (18 Mar 2018 08:12) (16 - 18)  SpO2: 95% (18 Mar 2018 08:12) (95% - 100%)    GENERAL: NAD, well-developed, disheveled with poor personal hygiene   HEAD:  Atraumatic, Normocephalic, dry mucous membranes  EYES: EOMI  NECK: Supple, No JVD  CHEST/LUNG: Clear to auscultation bilaterally; No wheeze  HEART: irregular rate and rhythm; No murmurs, rubs, or gallops  ABDOMEN: Soft, Nontender, Nondistended; Bowel sounds present  EXTREMITIES:  2+ Peripheral Pulses, No LE edema  : very large L inguinal hernia and scrotum, anterior scrotal skin slightly erythematous but not warm  PSYCH: AAOx2-3, unclear as to exact date  NEUROLOGY: CNs grossly intact, motor strength symmetric  SKIN: Very dry skin, 2 great toes with tape on them, bloodied, non-tender    LABS:                        13.4   11.12 )-----------( 149      ( 18 Mar 2018 08:37 )             38.8     -18    141  |  101  |  16  ----------------------------<  136<H>  3.7   |  22  |  1.07    Ca    9.2      18 Mar 2018 05:30  Phos  2.3     03-18  Mg     2.0     -18    TPro  7.6  /  Alb  4.2  /  TBili  0.6  /  DBili  x   /  AST  20  /  ALT  10  /  AlkPhos  78  03-18    PT/INR - ( 18 Mar 2018 08:37 )   PT: 11.8 sec;   INR: 1.04 ratio         PTT - ( 18 Mar 2018 08:37 )  PTT:27.7 sec  CAPILLARY BLOOD GLUCOSE        CARDIAC MARKERS ( 18 Mar 2018 05:30 )  x     / <0.01 ng/mL / 332 U/L / x     / 6.5 ng/mL  CARDIAC MARKERS ( 17 Mar 2018 12:58 )  x     / <0.01 ng/mL / x     / x     / 2.9 ng/mL      Urinalysis Basic - ( 17 Mar 2018 16:43 )    Color: PL Yellow / Appearance: Clear / S.015 / pH: x  Gluc: x / Ketone: Negative  / Bili: Negative / Urobili: Negative   Blood: x / Protein: 30 mg/dL / Nitrite: Negative   Leuk Esterase: Trace / RBC: 0-2 /HPF / WBC 2-5 /HPF   Sq Epi: x / Non Sq Epi: Occasional /HPF / Bacteria: x

## 2018-03-18 NOTE — PROGRESS NOTE ADULT - PROBLEM SELECTOR PLAN 4
--fever by rectal temp and tachycardic in the ED  --patient has no complaints that would help to localize infection  --UA neg, UCx, BCx x 2, RVP (-)  --will continue with broad spectrum antibiotics for now as clinical picture is unclear but patient is encephalopathic with elevated lactate.

## 2018-03-18 NOTE — CHART NOTE - NSCHARTNOTEFT_GEN_A_CORE
Called by RN for patient complaining of chest discomfort 4/10 around 5 AM. By the time I evaluated patient chest discomfort had resolved. Was in the setting of BP again being elevated to 210/110. Treated with labetalol IVP, then hydralazine IVP. Oral metoprolol and hydralazine doses increased and morning doses given. EKG with afib, no ischemic changes, no different from admission EKG. Gloria sent. Continue to monitor on telemetry.     Melissa Arana MD  [phone]

## 2018-03-18 NOTE — PROGRESS NOTE ADULT - PROBLEM SELECTOR PLAN 1
Continue home metoprolol. Hold HCTZ as giving fluids. Will continue oral hydralazine for now but plan to transition to better long term agents such as ACE-I/ARB or CCB when BP better controlled.  Continue on telemetry

## 2018-03-18 NOTE — ED ADULT NURSE REASSESSMENT NOTE - NS ED NURSE REASSESS COMMENT FT1
bp persistently elevated, md aware pt a/ox3 denies any complaints at this time. will monitor/
ct results available md aware will monitor./
Pt blood pressure elevated to 200s, MD Dudley aware
PT is able to answer orientation question, but is acting confused with plan of care. MD Roque notified.
PT is a/ox3 but states he still "feels off" since this morning. Pt ambulating without assist, steady gait.

## 2018-03-18 NOTE — CONSULT NOTE ADULT - SUBJECTIVE AND OBJECTIVE BOX
Acute Care Surgery Consult Note ([facility])    72 M presented with weakness. Patient woke up today and said he felt weak. He was then brought to the hospital. Found to have a BP of 200. Lactate was also 4. Given elevated lactate, Surgery was consulted to evaluate inguinal hernia. Patient has had a large L inguinal hernia extending into his scrotum for many years. Patient denies any pain in his scrotum. Denies abdominal pain. Denies nausea or vomiting. Patient is passing flatus and having bowel movements.    ROS: 10 point ROS otherwise negative  PMH/PSH: HTN, HLD, CAD s/p CABG  Social: Lives alone  Family: Not significant    Tmx: 38.3 (03-17-18 @ 13:40)  HR: 90  BP: 187/109  RR: 18  SpO2: 97%    Gen: Disheveled  Lungs: Non-labored breathing  Heart: S1, S2  Abdomen: Soft, ND, NTP. Large L inguinal hernia extending into scrotum. Not reducible. No tenderness. Some erythema of scrotum but says it is chronic.    03-17-18    WBC: 8.2  Hgb: 16.0  Hct: 45.7  Plt: 153    Na: 141  K: 3.9  Cl: 99  HCO3: 25  BUN: 25  Cr: 1.21  Glu: 157    Ca: 9.6    Protein: 9.1  Albumin: 4.7  Total bilirubin: 0.4  Alk phos: 91  AST: 20  ALT: 13    INR: 1.05  PTT: 28.1    CT abdomen/pelvis: Bowel loops are normal in caliber. A very large left inguinal hernia containing both small and large bowel is noted. It is incompletely imaged on this exam. Stool is noted within the large bowel. Acute Care Surgery Consult Note ([facility])    72 M presented with weakness. Patient woke up today and said he felt weak. He was then brought to the hospital. Found to have a BP of 200. Lactate was also 4. Given elevated lactate, Surgery was consulted to evaluate inguinal hernia. Patient has had a large L inguinal hernia extending into his scrotum for many years. Patient denies any pain in his scrotum. Denies abdominal pain. Denies nausea or vomiting. Patient is passing flatus and having bowel movements.    ROS: 10 point ROS otherwise negative  PMH/PSH: HTN, HLD, CAD s/p CABG  Social: Lives alone  Family: Not significant    Tmx: 38.3 (03-17-18 @ 13:40)  HR: 90  BP: 187/109  RR: 18  SpO2: 97%    Gen: Disheveled  Lungs: Non-labored breathing  Heart: S1, S2  Abdomen: Soft, ND, NTP. Large L inguinal hernia extending into scrotum. Not reducible. No tenderness. Some erythema of scrotum but says it is chronic.  Extremities: Has bilateral chronic wounds of his toe    03-17-18    WBC: 8.2  Hgb: 16.0  Hct: 45.7  Plt: 153    Na: 141  K: 3.9  Cl: 99  HCO3: 25  BUN: 25  Cr: 1.21  Glu: 157    Ca: 9.6    Protein: 9.1  Albumin: 4.7  Total bilirubin: 0.4  Alk phos: 91  AST: 20  ALT: 13    INR: 1.05  PTT: 28.1    CT abdomen/pelvis: Bowel loops are normal in caliber. A very large left inguinal hernia containing both small and large bowel is noted. It is incompletely imaged on this exam. Stool is noted within the large bowel.

## 2018-03-18 NOTE — CONSULT NOTE ADULT - ASSESSMENT
72 M presents with weakness. Also noted to have elevated lactate. Consulted to assess chronic large L inguinal hernia. Bowel within hernia is viable and not obstructed. Not convinced elevated lactate is secondary to hernia. But will continue to follow.  -Serial abdominal exams  -Repeat lactate  FABBY Bernal  [id number] 72 M presents with weakness. Also noted to have elevated lactate. Consulted to assess chronic large L inguinal hernia. Bowel within hernia is viable and not obstructed. Not convinced elevated lactate is secondary to hernia. But will continue to follow.  -Serial abdominal exams  -Repeat lactate  -D/w attending  FABBY Bernal  [id number]

## 2018-03-18 NOTE — PROGRESS NOTE ADULT - PROBLEM SELECTOR PLAN 2
--metabolic vs 2/2 HTN urgency vs neuro etiology. Patient with SIRS+ and possible infection. Also being evaluated for PRES.   --Appreciate neuro imput. MRI/MRI ordered although pt deferring for today  --will cover with broad spectrum antibiotics at this time  --fall precautions

## 2018-03-19 LAB
ALDOST SERPL-MCNC: 11.8 NG/DL — SIGNIFICANT CHANGE UP
ANION GAP SERPL CALC-SCNC: 14 MMOL/L — SIGNIFICANT CHANGE UP (ref 5–17)
BUN SERPL-MCNC: 17 MG/DL — SIGNIFICANT CHANGE UP (ref 7–23)
CALCIUM SERPL-MCNC: 8.8 MG/DL — SIGNIFICANT CHANGE UP (ref 8.4–10.5)
CHLORIDE SERPL-SCNC: 103 MMOL/L — SIGNIFICANT CHANGE UP (ref 96–108)
CO2 SERPL-SCNC: 23 MMOL/L — SIGNIFICANT CHANGE UP (ref 22–31)
CREAT SERPL-MCNC: 1.3 MG/DL — SIGNIFICANT CHANGE UP (ref 0.5–1.3)
CULTURE RESULTS: NO GROWTH — SIGNIFICANT CHANGE UP
GLUCOSE SERPL-MCNC: 132 MG/DL — HIGH (ref 70–99)
HCT VFR BLD CALC: 41 % — SIGNIFICANT CHANGE UP (ref 39–50)
HGB BLD-MCNC: 13.5 G/DL — SIGNIFICANT CHANGE UP (ref 13–17)
MCHC RBC-ENTMCNC: 31.2 PG — SIGNIFICANT CHANGE UP (ref 27–34)
MCHC RBC-ENTMCNC: 32.9 GM/DL — SIGNIFICANT CHANGE UP (ref 32–36)
MCV RBC AUTO: 94.7 FL — SIGNIFICANT CHANGE UP (ref 80–100)
NRBC # BLD: 0 /100 WBCS — SIGNIFICANT CHANGE UP (ref 0–0)
PLATELET # BLD AUTO: 154 K/UL — SIGNIFICANT CHANGE UP (ref 150–400)
POTASSIUM SERPL-MCNC: 4 MMOL/L — SIGNIFICANT CHANGE UP (ref 3.5–5.3)
POTASSIUM SERPL-SCNC: 4 MMOL/L — SIGNIFICANT CHANGE UP (ref 3.5–5.3)
PROCALCITONIN SERPL-MCNC: <0.05 NG/ML — SIGNIFICANT CHANGE UP (ref 0–0.04)
RBC # BLD: 4.33 M/UL — SIGNIFICANT CHANGE UP (ref 4.2–5.8)
RBC # FLD: 14.9 % — HIGH (ref 10.3–14.5)
RENIN DIRECT, PLASMA: <2.1 PG/ML — SIGNIFICANT CHANGE UP
SODIUM SERPL-SCNC: 140 MMOL/L — SIGNIFICANT CHANGE UP (ref 135–145)
SPECIMEN SOURCE: SIGNIFICANT CHANGE UP
VANCOMYCIN TROUGH SERPL-MCNC: 8.7 UG/ML — LOW (ref 10–20)
WBC # BLD: 8.34 K/UL — SIGNIFICANT CHANGE UP (ref 3.8–10.5)
WBC # FLD AUTO: 8.34 K/UL — SIGNIFICANT CHANGE UP (ref 3.8–10.5)

## 2018-03-19 PROCEDURE — 93306 TTE W/DOPPLER COMPLETE: CPT | Mod: 26

## 2018-03-19 RX ORDER — ZOLPIDEM TARTRATE 10 MG/1
5 TABLET ORAL AT BEDTIME
Qty: 0 | Refills: 0 | Status: DISCONTINUED | OUTPATIENT
Start: 2018-03-19 | End: 2018-03-23

## 2018-03-19 RX ORDER — HYDRALAZINE HCL 50 MG
50 TABLET ORAL EVERY 8 HOURS
Qty: 0 | Refills: 0 | Status: DISCONTINUED | OUTPATIENT
Start: 2018-03-19 | End: 2018-03-21

## 2018-03-19 RX ORDER — PIPERACILLIN AND TAZOBACTAM 4; .5 G/20ML; G/20ML
3.38 INJECTION, POWDER, LYOPHILIZED, FOR SOLUTION INTRAVENOUS EVERY 8 HOURS
Qty: 0 | Refills: 0 | Status: DISCONTINUED | OUTPATIENT
Start: 2018-03-19 | End: 2018-03-20

## 2018-03-19 RX ORDER — VANCOMYCIN HCL 1 G
1000 VIAL (EA) INTRAVENOUS EVERY 12 HOURS
Qty: 0 | Refills: 0 | Status: DISCONTINUED | OUTPATIENT
Start: 2018-03-19 | End: 2018-03-20

## 2018-03-19 RX ADMIN — PIPERACILLIN AND TAZOBACTAM 25 GRAM(S): 4; .5 INJECTION, POWDER, LYOPHILIZED, FOR SOLUTION INTRAVENOUS at 05:01

## 2018-03-19 RX ADMIN — Medication 50 MILLIGRAM(S): at 13:39

## 2018-03-19 RX ADMIN — Medication 50 MILLIGRAM(S): at 22:01

## 2018-03-19 RX ADMIN — Medication 250 MILLIGRAM(S): at 17:53

## 2018-03-19 RX ADMIN — Medication 25 MILLIGRAM(S): at 05:02

## 2018-03-19 RX ADMIN — ATORVASTATIN CALCIUM 40 MILLIGRAM(S): 80 TABLET, FILM COATED ORAL at 22:01

## 2018-03-19 RX ADMIN — Medication 50 MILLIGRAM(S): at 17:53

## 2018-03-19 RX ADMIN — Medication 50 MILLIGRAM(S): at 05:01

## 2018-03-19 RX ADMIN — PIPERACILLIN AND TAZOBACTAM 25 GRAM(S): 4; .5 INJECTION, POWDER, LYOPHILIZED, FOR SOLUTION INTRAVENOUS at 22:01

## 2018-03-19 RX ADMIN — Medication 250 MILLIGRAM(S): at 07:41

## 2018-03-19 RX ADMIN — Medication 81 MILLIGRAM(S): at 11:29

## 2018-03-19 RX ADMIN — PIPERACILLIN AND TAZOBACTAM 25 GRAM(S): 4; .5 INJECTION, POWDER, LYOPHILIZED, FOR SOLUTION INTRAVENOUS at 13:40

## 2018-03-19 RX ADMIN — ZOLPIDEM TARTRATE 5 MILLIGRAM(S): 10 TABLET ORAL at 22:01

## 2018-03-19 RX ADMIN — HEPARIN SODIUM 5000 UNIT(S): 5000 INJECTION INTRAVENOUS; SUBCUTANEOUS at 05:02

## 2018-03-19 RX ADMIN — HEPARIN SODIUM 5000 UNIT(S): 5000 INJECTION INTRAVENOUS; SUBCUTANEOUS at 22:01

## 2018-03-19 RX ADMIN — HEPARIN SODIUM 5000 UNIT(S): 5000 INJECTION INTRAVENOUS; SUBCUTANEOUS at 13:40

## 2018-03-19 NOTE — CONSULT NOTE ADULT - ASSESSMENT
73 y/o M w/ bilateral severe ingrown toenails hallux  - GOOD/PVR ordered to ensure proper blood flow to foot  - Pt wound benefit from removing excess skin/ soft tissue from around toenails to prevent from future potential infections  - Planning for bedside nail avulsion with removal of hypertrophic skin to both hallux bedside tomorrow with Dr. Leavitt  - Will d/w pt in am  - Foot not likely source of SIRS  - xray changes not likely OM, ESR and CRP WNL  - D/w attending  - Will cont to follow

## 2018-03-19 NOTE — PROGRESS NOTE ADULT - SUBJECTIVE AND OBJECTIVE BOX
Denies any cough, SOB, N/V, abd pain, diarrhea, fevers, chills, stiff neck    Vital Signs Last 24 Hrs  T(C): 36.8 (19 Mar 2018 11:37), Max: 37.6 (19 Mar 2018 04:03)  T(F): 98.2 (19 Mar 2018 11:37), Max: 99.6 (19 Mar 2018 04:03)  HR: 74 (19 Mar 2018 11:37) (68 - 90)  BP: 193/95 (19 Mar 2018 11:37) (166/96 - 193/95)  BP(mean): --  RR: 18 (19 Mar 2018 11:37) (17 - 18)  SpO2: 95% (19 Mar 2018 11:37) (93% - 95%)    GENERAL: NAD, well-developed, disheveled with poor personal hygiene   HEAD:  Atraumatic, Normocephalic, dry mucous membranes  EYES: EOMI  NECK: Supple, No JVD  CHEST/LUNG: Clear to auscultation bilaterally; No wheeze  HEART: irregular rate and rhythm; No murmurs, rubs, or gallops  ABDOMEN: Soft, Nontender, Nondistended; Bowel sounds present  EXTREMITIES:  2+ Peripheral Pulses, No LE edema  : very large L inguinal hernia and scrotum, anterior scrotal skin slightly erythematous but not warm  PSYCH: AAOx2-3, unclear as to exact date  NEUROLOGY: CNs grossly intact, motor strength symmetric  SKIN: Very dry skin, 2 great toes with tape on them, bloodied, non-tender Denies any cough, SOB, N/V, abd pain, diarrhea, fevers, chills, stiff neck    Vital Signs Last 24 Hrs  T(C): 36.8 (19 Mar 2018 11:37), Max: 37.6 (19 Mar 2018 04:03)  T(F): 98.2 (19 Mar 2018 11:37), Max: 99.6 (19 Mar 2018 04:03)  HR: 74 (19 Mar 2018 11:37) (68 - 90)  BP: 193/95 (19 Mar 2018 11:37) (166/96 - 193/95)  BP(mean): --  RR: 18 (19 Mar 2018 11:37) (17 - 18)  SpO2: 95% (19 Mar 2018 11:37) (93% - 95%)    GENERAL: NAD, well-developed, disheveled with poor personal hygiene   HEAD:  Atraumatic, Normocephalic, dry mucous membranes  EYES: EOMI  NECK: Supple, No JVD  CHEST/LUNG: Clear to auscultation bilaterally; No wheeze  HEART: irregular rate and rhythm; No murmurs, rubs, or gallops  ABDOMEN: Soft, Nontender, Nondistended; Bowel sounds present  EXTREMITIES:  2+ Peripheral Pulses, No LE edema  : very large L inguinal hernia and scrotum, anterior scrotal skin slightly erythematous but not warm  PSYCH: falt affect  NEUROLOGY: A+O x 3, CNs grossly intact, motor strength symmetric    LABS:                        13.5   8.34  )-----------( 154      ( 19 Mar 2018 06:33 )             41.0     03-19    140  |  103  |  17  ----------------------------<  132<H>  4.0   |  23  |  1.30    Ca    8.8      19 Mar 2018 05:51  Phos  2.3     03-18  Mg     2.0     03-18    TPro  7.6  /  Alb  4.2  /  TBili  0.6  /  DBili  x   /  AST  20  /  ALT  10  /  AlkPhos  78  03-18    PT/INR - ( 18 Mar 2018 08:37 )   PT: 11.8 sec;   INR: 1.04 ratio         PTT - ( 18 Mar 2018 08:37 )  PTT:27.7 sec  CAPILLARY BLOOD GLUCOSE        CARDIAC MARKERS ( 18 Mar 2018 05:30 )  x     / <0.01 ng/mL / 332 U/L / x     / 6.5 ng/mL  CARDIAC MARKERS ( 17 Mar 2018 12:58 )  x     / <0.01 ng/mL / x     / x     / 2.9 ng/mL      Urinalysis Basic - ( 17 Mar 2018 16:43 )    Color: PL Yellow / Appearance: Clear / S.015 / pH: x  Gluc: x / Ketone: Negative  / Bili: Negative / Urobili: Negative   Blood: x / Protein: 30 mg/dL / Nitrite: Negative   Leuk Esterase: Trace / RBC: 0-2 /HPF / WBC 2-5 /HPF   Sq Epi: x / Non Sq Epi: Occasional /HPF / Bacteria: x

## 2018-03-19 NOTE — PROGRESS NOTE ADULT - PROBLEM SELECTOR PLAN 8
--b/l adrenal nodules.   Ordered renin/aldosterone, and serum/urine metanephrines --b/l adrenal nodules.   renin/aldosterone, and serum/urine metanephrines are pending

## 2018-03-19 NOTE — PROGRESS NOTE ADULT - PROBLEM SELECTOR PLAN 3
--was RVR up to 120s in the ED, received labetalol and responded  --unknown chronicity of afib, patient unaware of ever carrying this diagnosis  --no urgent need to anticoagulate, although patient will likely need A/C for increased stroke risk (CHADs2-VASC = 3)  Continue home metoprolol for rate control  --check TTE for any structural changes. --was RVR up to 120s in the ED, received labetalol and responded  --unknown chronicity of afib, patient unaware of ever carrying this diagnosis  --no urgent need to anticoagulate, although patient will likely need A/C for increased stroke risk (CHADs2-VASC = 3)  Continue home metoprolol for rate control  TTE is pending  Will broach full AC with pt, he denies any history of recent falls but lives alone unsupervised

## 2018-03-19 NOTE — PROGRESS NOTE ADULT - PROBLEM SELECTOR PLAN 9
--VTE PPX with HSQ for now until can have proper discussion regarding anticoagulation when patient's mentation improves. --VTE PPX with HSQ; will broach full AC with pt

## 2018-03-19 NOTE — PROGRESS NOTE ADULT - PROBLEM SELECTOR PLAN 2
--metabolic vs 2/2 HTN urgency vs neuro etiology. Patient with SIRS+ and possible infection. Also being evaluated for PRES.   --Appreciate neuro imput. MRI/MRI ordered although pt deferring for today  --will cover with broad spectrum antibiotics at this time  --fall precautions could be 2' HTN urgency  d/karina the antibx 3/19

## 2018-03-19 NOTE — PROGRESS NOTE ADULT - PROBLEM SELECTOR PLAN 1
Continue home metoprolol. Hold HCTZ as giving fluids. Will continue oral hydralazine for now but plan to transition to better long term agents such as ACE-I/ARB or CCB when BP better controlled.  Continue on telemetry increased hydralazine to 50 mg q8h  lopressor 50 mg bid

## 2018-03-19 NOTE — PROGRESS NOTE ADULT - PROBLEM SELECTOR PLAN 4
--fever by rectal temp and tachycardic in the ED  --patient has no complaints that would help to localize infection  --UA neg, UCx, BCx x 2, RVP (-)  --will continue with broad spectrum antibiotics for now as clinical picture is unclear but patient is encephalopathic with elevated lactate. possible osteomyelitis distal phalanx both great toes on xray  cont vanco/zosyn empirically for now  blood cx 3/17 negative so far  podiatry consult today

## 2018-03-19 NOTE — CONSULT NOTE ADULT - SUBJECTIVE AND OBJECTIVE BOX
Podiatry pager #: [phone]    Patient is a 72y old  Male who presents with a chief complaint of disorientation, left sided weakness (17 Mar 2018 20:33)    HPI:  72M PMH HTN, HLD, CAD/CABG (10 years ago, no stents), L eye cataract presents with left sided weakness and "disorientation." States he woke up and felt disoriented and he didn't understand where he was. Could not accomplish simple tasks. He noticed a difference in strength between his arms/legs, with the left side being perhaps slightly weaker. Called the ambulance himself. He reports feeling unsteady on his feet as well as slightly confused. Patient is somewhat of a poor historian. Denies chest pain, SOB, NVD, abdominal pain, URI symptoms, fevers/chills at home, cough, dizziness, dysuria, frequency, LE edema. He has chronic wounds of b/l great toes which he has been taping for two years. His scrotum is significantly enlarged due to a large L inguinal hernia.    In ED, BP was very elevated to 235/135, -120 afib RVR, Temp 100.9 rectal, RR 16 O2sat 100% on RA. He was given labetalol 10mg X4 IVP, labetalol 100mg oral, 2L bolus. He had elevated lactate to 4.4 which did not improve with IVF. (17 Mar 2018 20:33)    Podiatry consulted for bilateral foot abnormalities. Pt states toes have been this way for a year, denies seeking treatment for them. Pt states dont really bother him. denies noting any drainage, malodor from feet. Pt ambulates in tennis shoes, presents to ED in flip flops.    PAST MEDICAL & SURGICAL HISTORY:  Cataract: L eye  Inguinal hernia  CAD (coronary artery disease): s/p CABG  Hyperlipemia  H/O inguinal hernia repair: R sided  History of appendectomy  S/P CABG (coronary artery bypass graft)    MEDICATIONS  (STANDING):  aspirin enteric coated 81 milliGRAM(s) Oral daily  atorvastatin 40 milliGRAM(s) Oral at bedtime  heparin  Injectable 5000 Unit(s) SubCutaneous every 8 hours  hydrALAZINE 50 milliGRAM(s) Oral every 8 hours  influenza   Vaccine 0.5 milliLiter(s) IntraMuscular once  metoprolol     tartrate 50 milliGRAM(s) Oral two times a day  piperacillin/tazobactam IVPB. 3.375 Gram(s) IV Intermittent every 8 hours  vancomycin  IVPB 1000 milliGRAM(s) IV Intermittent every 12 hours    MEDICATIONS  (PRN):  zolpidem 5 milliGRAM(s) Oral at bedtime PRN Insomnia    Allergies    No Known Allergies    Intolerances    VITALS:    Vital Signs Last 24 Hrs  T(C): 36.8 (19 Mar 2018 11:37), Max: 37.6 (19 Mar 2018 04:03)  T(F): 98.2 (19 Mar 2018 11:37), Max: 99.6 (19 Mar 2018 04:03)  HR: 74 (19 Mar 2018 11:37) (68 - 86)  BP: 193/95 (19 Mar 2018 11:37) (166/96 - 193/95)  BP(mean): --  RR: 18 (19 Mar 2018 11:37) (17 - 18)  SpO2: 95% (19 Mar 2018 11:37) (93% - 95%)    LABS:                     13.5   8.34  )-----------( 154      ( 19 Mar 2018 06:33 )             41.0     03-19    140  |  103  |  17  ----------------------------<  132<H>  4.0   |  23  |  1.30    Ca    8.8      19 Mar 2018 05:51  Phos  2.3     03-18  Mg     2.0     03-18    TPro  7.6  /  Alb  4.2  /  TBili  0.6  /  DBili  x   /  AST  20  /  ALT  10  /  AlkPhos  78  03-18    CAPILLARY BLOOD GLUCOSE    PT/INR - ( 18 Mar 2018 08:37 )   PT: 11.8 sec;   INR: 1.04 ratio      PTT - ( 18 Mar 2018 08:37 )  PTT:27.7 sec    LOWER EXTREMITY PHYSICAL EXAM:    Vasular: DP 2/4, PT 0/4, B/L, CFT <3 seconds B/L, Temperature gradient WNL, B/L.   Neuro: Epicritic sensation intact to the level of the digits, B/L.  Musculoskeletal/Ortho: Long hallux toe, B/L.   Skin: Bilateral hallux nail hyponychium and paranychium overgrowth, matrix and nailbed intact. No erythema, no edema, no fluctuance, no malodor, no purulence, no drainage.     RADIOLOGY & ADDITIONAL STUDIES:  < from: Xray Foot AP + Lateral + Oblique, Bilat (03.18.18 @ 13:26) >  EXAM:  FOOT COMPLETE BILAT-MIN 3 VIEWS                          PROCEDURE DATE:  03/18/2018      INTERPRETATION:  EXAMINATION: 3 views of each foot    CLINICAL INFORMATION: Bilateral great toe ulcers.    IMPRESSION:     There is resorptive tapering of the tuft of the distal phalanx of the   first digits bilaterally with adjacent soft tissue swelling, suspicious   for osteomyelitis.    There is no acute fracture or dislocation. There is no other gross   radiographic evidence of osteomyelitis.    CHER LOPES M.D., ATTENDING RADIOLOGIST  This document has been electronically signed. Mar 19 2018  8:49AM    < end of copied text >

## 2018-03-20 LAB
ANION GAP SERPL CALC-SCNC: 15 MMOL/L — SIGNIFICANT CHANGE UP (ref 5–17)
BUN SERPL-MCNC: 21 MG/DL — SIGNIFICANT CHANGE UP (ref 7–23)
CALCIUM SERPL-MCNC: 9.2 MG/DL — SIGNIFICANT CHANGE UP (ref 8.4–10.5)
CHLORIDE SERPL-SCNC: 103 MMOL/L — SIGNIFICANT CHANGE UP (ref 96–108)
CO2 SERPL-SCNC: 22 MMOL/L — SIGNIFICANT CHANGE UP (ref 22–31)
CREAT SERPL-MCNC: 1.45 MG/DL — HIGH (ref 0.5–1.3)
GLUCOSE SERPL-MCNC: 133 MG/DL — HIGH (ref 70–99)
HCT VFR BLD CALC: 43.1 % — SIGNIFICANT CHANGE UP (ref 39–50)
HGB BLD-MCNC: 13.9 G/DL — SIGNIFICANT CHANGE UP (ref 13–17)
LACTATE SERPL-SCNC: 1.1 MMOL/L — SIGNIFICANT CHANGE UP (ref 0.7–2)
MCHC RBC-ENTMCNC: 30.3 PG — SIGNIFICANT CHANGE UP (ref 27–34)
MCHC RBC-ENTMCNC: 32.3 GM/DL — SIGNIFICANT CHANGE UP (ref 32–36)
MCV RBC AUTO: 94.1 FL — SIGNIFICANT CHANGE UP (ref 80–100)
NRBC # BLD: 0 /100 WBCS — SIGNIFICANT CHANGE UP (ref 0–0)
PLATELET # BLD AUTO: 154 K/UL — SIGNIFICANT CHANGE UP (ref 150–400)
POTASSIUM SERPL-MCNC: 3.6 MMOL/L — SIGNIFICANT CHANGE UP (ref 3.5–5.3)
POTASSIUM SERPL-SCNC: 3.6 MMOL/L — SIGNIFICANT CHANGE UP (ref 3.5–5.3)
RBC # BLD: 4.58 M/UL — SIGNIFICANT CHANGE UP (ref 4.2–5.8)
RBC # FLD: 15 % — HIGH (ref 10.3–14.5)
SODIUM SERPL-SCNC: 140 MMOL/L — SIGNIFICANT CHANGE UP (ref 135–145)
WBC # BLD: 7.51 K/UL — SIGNIFICANT CHANGE UP (ref 3.8–10.5)
WBC # FLD AUTO: 7.51 K/UL — SIGNIFICANT CHANGE UP (ref 3.8–10.5)

## 2018-03-20 PROCEDURE — 93923 UPR/LXTR ART STDY 3+ LVLS: CPT | Mod: 26

## 2018-03-20 RX ORDER — APIXABAN 2.5 MG/1
5 TABLET, FILM COATED ORAL EVERY 12 HOURS
Qty: 0 | Refills: 0 | Status: DISCONTINUED | OUTPATIENT
Start: 2018-03-20 | End: 2018-03-23

## 2018-03-20 RX ORDER — METOPROLOL TARTRATE 50 MG
75 TABLET ORAL
Qty: 0 | Refills: 0 | Status: DISCONTINUED | OUTPATIENT
Start: 2018-03-20 | End: 2018-03-23

## 2018-03-20 RX ADMIN — PIPERACILLIN AND TAZOBACTAM 25 GRAM(S): 4; .5 INJECTION, POWDER, LYOPHILIZED, FOR SOLUTION INTRAVENOUS at 05:27

## 2018-03-20 RX ADMIN — ATORVASTATIN CALCIUM 40 MILLIGRAM(S): 80 TABLET, FILM COATED ORAL at 22:02

## 2018-03-20 RX ADMIN — APIXABAN 5 MILLIGRAM(S): 2.5 TABLET, FILM COATED ORAL at 17:03

## 2018-03-20 RX ADMIN — HEPARIN SODIUM 5000 UNIT(S): 5000 INJECTION INTRAVENOUS; SUBCUTANEOUS at 05:27

## 2018-03-20 RX ADMIN — Medication 50 MILLIGRAM(S): at 05:27

## 2018-03-20 RX ADMIN — Medication 50 MILLIGRAM(S): at 13:59

## 2018-03-20 RX ADMIN — Medication 75 MILLIGRAM(S): at 17:03

## 2018-03-20 RX ADMIN — Medication 81 MILLIGRAM(S): at 13:59

## 2018-03-20 RX ADMIN — Medication 50 MILLIGRAM(S): at 22:02

## 2018-03-20 RX ADMIN — Medication 250 MILLIGRAM(S): at 05:28

## 2018-03-20 NOTE — PROGRESS NOTE ADULT - SUBJECTIVE AND OBJECTIVE BOX
Denies any new symptoms    Vital Signs Last 24 Hrs  T(C): 36.9 (20 Mar 2018 04:09), Max: 36.9 (19 Mar 2018 21:16)  T(F): 98.5 (20 Mar 2018 04:09), Max: 98.5 (19 Mar 2018 21:16)  HR: 86 (20 Mar 2018 04:09) (86 - 106)  BP: 167/96 (20 Mar 2018 04:09) (167/85 - 179/105)  BP(mean): --  RR: 18 (20 Mar 2018 04:09) (18 - 18)  SpO2: 95% (19 Mar 2018 21:16) (95% - 95%)    GENERAL: NAD, well-developed, disheveled with poor personal hygiene   HEAD:  Atraumatic, Normocephalic, dry mucous membranes  EYES: EOMI  NECK: Supple, No JVD  CHEST/LUNG: Clear to auscultation bilaterally; No wheeze  HEART: irregular rate and rhythm; No murmurs, rubs, or gallops  ABDOMEN: Soft, Nontender, Nondistended; Bowel sounds present  EXTREMITIES:  2+ Peripheral Pulses, No LE edema  : very large L inguinal hernia and scrotum, anterior scrotal skin slightly erythematous but not warm  NEUROLOGY: A+O x 3, CNs grossly intact, motor strength symmetric    LABS:                        13.9   7.51  )-----------( 154      ( 20 Mar 2018 07:57 )             43.1     03-20    140  |  103  |  21  ----------------------------<  133<H>  3.6   |  22  |  1.45<H>    Ca    9.2      20 Mar 2018 05:32        CAPILLARY BLOOD GLUCOSE

## 2018-03-20 NOTE — PROGRESS NOTE ADULT - SUBJECTIVE AND OBJECTIVE BOX
Patient is a 72y old  Male who presents with a chief complaint of disorientation, left sided weakness (17 Mar 2018 20:33)       INTERVAL HPI/OVERNIGHT EVENTS:  Patient seen and evaluated at bedside.  Pt is resting comfortable in NAD. Denies N/V/F/C.  Pain rated at X/10    Allergies    No Known Allergies    Intolerances        Vital Signs Last 24 Hrs  T(C): 36.7 (20 Mar 2018 12:04), Max: 36.9 (19 Mar 2018 21:16)  T(F): 98.1 (20 Mar 2018 12:04), Max: 98.5 (19 Mar 2018 21:16)  HR: 78 (20 Mar 2018 12:04) (78 - 106)  BP: 154/80 (20 Mar 2018 12:04) (154/80 - 179/105)  BP(mean): --  RR: 18 (20 Mar 2018 12:04) (18 - 18)  SpO2: 94% (20 Mar 2018 12:04) (94% - 95%)    LABS:                        13.9   7.51  )-----------( 154      ( 20 Mar 2018 07:57 )             43.1     03-20    140  |  103  |  21  ----------------------------<  133<H>  3.6   |  22  |  1.45<H>    Ca    9.2      20 Mar 2018 05:32          CAPILLARY BLOOD GLUCOSE          Lower Extremity Physical Exam:  Bilateral paronychia with hypertrophy of the ungualabia and no purulence or cellulitis.  Minimal edema noted and no erythema and there is minimal tenderness on palpation.

## 2018-03-20 NOTE — PROGRESS NOTE ADULT - PROBLEM SELECTOR PLAN 4
unclear etiology  blood cx 3/17 negative so far  podiatry consult appreciated, unlikely to have osteomyelitis of 1st toes

## 2018-03-21 DIAGNOSIS — I48.91 UNSPECIFIED ATRIAL FIBRILLATION: ICD-10-CM

## 2018-03-21 DIAGNOSIS — I25.10 ATHEROSCLEROTIC HEART DISEASE OF NATIVE CORONARY ARTERY WITHOUT ANGINA PECTORIS: ICD-10-CM

## 2018-03-21 LAB
ANION GAP SERPL CALC-SCNC: 16 MMOL/L — SIGNIFICANT CHANGE UP (ref 5–17)
BASOPHILS # BLD AUTO: 0.02 K/UL — SIGNIFICANT CHANGE UP (ref 0–0.2)
BASOPHILS NFR BLD AUTO: 0.2 % — SIGNIFICANT CHANGE UP (ref 0–2)
BUN SERPL-MCNC: 25 MG/DL — HIGH (ref 7–23)
CALCIUM SERPL-MCNC: 9.1 MG/DL — SIGNIFICANT CHANGE UP (ref 8.4–10.5)
CHLORIDE SERPL-SCNC: 104 MMOL/L — SIGNIFICANT CHANGE UP (ref 96–108)
CO2 SERPL-SCNC: 21 MMOL/L — LOW (ref 22–31)
CREAT SERPL-MCNC: 1.3 MG/DL — SIGNIFICANT CHANGE UP (ref 0.5–1.3)
EOSINOPHIL # BLD AUTO: 0.42 K/UL — SIGNIFICANT CHANGE UP (ref 0–0.5)
EOSINOPHIL NFR BLD AUTO: 5.1 % — SIGNIFICANT CHANGE UP (ref 0–6)
GLUCOSE SERPL-MCNC: 123 MG/DL — HIGH (ref 70–99)
HCT VFR BLD CALC: 43.5 % — SIGNIFICANT CHANGE UP (ref 39–50)
HGB BLD-MCNC: 14.6 G/DL — SIGNIFICANT CHANGE UP (ref 13–17)
IMM GRANULOCYTES NFR BLD AUTO: 0.4 % — SIGNIFICANT CHANGE UP (ref 0–1.5)
LYMPHOCYTES # BLD AUTO: 1.16 K/UL — SIGNIFICANT CHANGE UP (ref 1–3.3)
LYMPHOCYTES # BLD AUTO: 14.1 % — SIGNIFICANT CHANGE UP (ref 13–44)
MCHC RBC-ENTMCNC: 30.9 PG — SIGNIFICANT CHANGE UP (ref 27–34)
MCHC RBC-ENTMCNC: 33.6 GM/DL — SIGNIFICANT CHANGE UP (ref 32–36)
MCV RBC AUTO: 92 FL — SIGNIFICANT CHANGE UP (ref 80–100)
MONOCYTES # BLD AUTO: 0.57 K/UL — SIGNIFICANT CHANGE UP (ref 0–0.9)
MONOCYTES NFR BLD AUTO: 6.9 % — SIGNIFICANT CHANGE UP (ref 2–14)
NEUTROPHILS # BLD AUTO: 6.04 K/UL — SIGNIFICANT CHANGE UP (ref 1.8–7.4)
NEUTROPHILS NFR BLD AUTO: 73.3 % — SIGNIFICANT CHANGE UP (ref 43–77)
PLATELET # BLD AUTO: 165 K/UL — SIGNIFICANT CHANGE UP (ref 150–400)
POTASSIUM SERPL-MCNC: 3.8 MMOL/L — SIGNIFICANT CHANGE UP (ref 3.5–5.3)
POTASSIUM SERPL-SCNC: 3.8 MMOL/L — SIGNIFICANT CHANGE UP (ref 3.5–5.3)
RBC # BLD: 4.73 M/UL — SIGNIFICANT CHANGE UP (ref 4.2–5.8)
RBC # FLD: 15.1 % — HIGH (ref 10.3–14.5)
SODIUM SERPL-SCNC: 141 MMOL/L — SIGNIFICANT CHANGE UP (ref 135–145)
WBC # BLD: 8.24 K/UL — SIGNIFICANT CHANGE UP (ref 3.8–10.5)
WBC # FLD AUTO: 8.24 K/UL — SIGNIFICANT CHANGE UP (ref 3.8–10.5)

## 2018-03-21 RX ORDER — LISINOPRIL 2.5 MG/1
20 TABLET ORAL DAILY
Qty: 0 | Refills: 0 | Status: DISCONTINUED | OUTPATIENT
Start: 2018-03-21 | End: 2018-03-23

## 2018-03-21 RX ADMIN — ATORVASTATIN CALCIUM 40 MILLIGRAM(S): 80 TABLET, FILM COATED ORAL at 22:19

## 2018-03-21 RX ADMIN — ZOLPIDEM TARTRATE 5 MILLIGRAM(S): 10 TABLET ORAL at 22:19

## 2018-03-21 RX ADMIN — Medication 50 MILLIGRAM(S): at 05:41

## 2018-03-21 RX ADMIN — Medication 81 MILLIGRAM(S): at 11:41

## 2018-03-21 RX ADMIN — LISINOPRIL 20 MILLIGRAM(S): 2.5 TABLET ORAL at 06:07

## 2018-03-21 RX ADMIN — Medication 75 MILLIGRAM(S): at 05:41

## 2018-03-21 RX ADMIN — Medication 0.1 MILLIGRAM(S): at 22:19

## 2018-03-21 RX ADMIN — APIXABAN 5 MILLIGRAM(S): 2.5 TABLET, FILM COATED ORAL at 18:09

## 2018-03-21 RX ADMIN — APIXABAN 5 MILLIGRAM(S): 2.5 TABLET, FILM COATED ORAL at 05:41

## 2018-03-21 RX ADMIN — Medication 0.1 MILLIGRAM(S): at 13:01

## 2018-03-21 RX ADMIN — Medication 0.1 MILLIGRAM(S): at 08:09

## 2018-03-21 NOTE — PHYSICAL THERAPY INITIAL EVALUATION ADULT - GENERAL OBSERVATIONS, REHAB EVAL
Pt received semi-supine in bed, (+) enhanced supervision, telemetry monitor, NAD. Pt alert, confused at times, agreeable to PT eval.

## 2018-03-21 NOTE — PHYSICAL THERAPY INITIAL EVALUATION ADULT - DISCHARGE DISPOSITION, PT EVAL
Home with outpatient PT for gait & balance training. No AD recommendation. Supervision with all mobility skills at this time due pt impulsivity/poor safety awareness. *TERESA villasenor./home w/ outpatient services

## 2018-03-21 NOTE — PHYSICAL THERAPY INITIAL EVALUATION ADULT - BALANCE TRAINING, PT EVAL
GOAL: Pt will improve dynamic standing balance by 1/2 grade to improve level of independence with mobility skills and ADLs in 2 weeks.

## 2018-03-21 NOTE — PHYSICAL THERAPY INITIAL EVALUATION ADULT - PERTINENT HX OF CURRENT PROBLEM, REHAB EVAL
72M PMH HTN, HLD, CAD/CABG (10 years ago, no stents), L eye cataract presents with left sided weakness and "disorientation," found to have HTN urgency, fever/SIRS positive without a clear source, new onset afib, and unexplained elevated lactate. 72M PMH HTN, HLD, CAD/CABG (10 years ago, no stents), L eye cataract presents with left sided weakness and "disorientation," found to have HTN urgency, fever/SIRS positive without a clear source, new onset afib, and unexplained elevated lactate. EKG: afib with rapid ventricular response. X-ray B foot (3/18): (-) fx, findings suspicious for osteomyelitis distal phalanx B first digits. CT head (3/17): (-). CT A/P (3/17): large L inguinal hernia

## 2018-03-21 NOTE — PROGRESS NOTE ADULT - PROBLEM SELECTOR PLAN 3
-  - can stop ASA while on eliquis.  - cont BB and ACE-I  - cont statin.    will follow.    Corky Guardado M.D., [facility]  [phone]

## 2018-03-21 NOTE — PROGRESS NOTE ADULT - PROBLEM SELECTOR PLAN 1
-  - rates acceptable.  - cont lopressor.  - echo report reviewed.  - Discussed risks/benefits/alternatives of AC with a NOAC, including bleeding risks and patient expressed understanding. Patient is agreeable to continue with AC for primary stroke prevention.  Cont eliquis 5 mg po BID.

## 2018-03-21 NOTE — PHYSICAL THERAPY INITIAL EVALUATION ADULT - ADDITIONAL COMMENTS
Pt lives alone in co-op first floor apartment, no stairs to enter or within. Pt does not own any ADs.

## 2018-03-21 NOTE — PROGRESS NOTE ADULT - SUBJECTIVE AND OBJECTIVE BOX
Patient is a 72y old  Male who presents with a chief complaint of disorientation, left sided weakness (17 Mar 2018 20:33)      SUBJECTIVE / OVERNIGHT EVENTS:    Patient seen and examined. denies cp sob ha dizziness. BP still elevated but improved.      Vital Signs Last 24 Hrs  T(C): 36.8 (21 Mar 2018 05:09), Max: 37.2 (20 Mar 2018 21:24)  T(F): 98.2 (21 Mar 2018 05:09), Max: 99 (20 Mar 2018 21:24)  HR: 73 (21 Mar 2018 06:39) (64 - 92)  BP: 157/84 (21 Mar 2018 06:39) (154/80 - 181/100)  BP(mean): --  RR: 18 (21 Mar 2018 06:39) (18 - 18)  SpO2: 93% (21 Mar 2018 06:39) (93% - 96%)  I&O's Summary    20 Mar 2018 07:01  -  21 Mar 2018 07:00  --------------------------------------------------------  IN: 960 mL / OUT: 200 mL / NET: 760 mL        PE:  GENERAL: NAD, AAOx3  HEAD:  Atraumatic, Normocephalic  EYES: EOMI, PERRLA, conjunctiva and sclera clear  NECK: Supple, No JVD  CHEST/LUNG: CTABL, No wheeze  HEART: irregular, no murmur  ABDOMEN: Soft, Nontender, Nondistended; Bowel sounds present  EXTREMITIES:  2+ Peripheral Pulses, No clubbing, cyanosis, or edema, left toe CDI  SKIN: No rashes or lesions  NEURO: No focal deficits    LABS:                        13.9   7.51  )-----------( 154      ( 20 Mar 2018 07:57 )             43.1     03-21    141  |  104  |  25<H>  ----------------------------<  123<H>  3.8   |  21<L>  |  1.30    Ca    9.1      21 Mar 2018 06:15        CAPILLARY BLOOD GLUCOSE                RADIOLOGY & ADDITIONAL TESTS:    Imaging Personally Reviewed:  [x] YES  [ ] NO    Consultant(s) Notes Reviewed:  [x] YES  [ ] NO    MEDICATIONS  (STANDING):  apixaban 5 milliGRAM(s) Oral every 12 hours  aspirin enteric coated 81 milliGRAM(s) Oral daily  atorvastatin 40 milliGRAM(s) Oral at bedtime  cloNIDine 0.1 milliGRAM(s) Oral three times a day  influenza   Vaccine 0.5 milliLiter(s) IntraMuscular once  lisinopril 20 milliGRAM(s) Oral daily  metoprolol     tartrate 75 milliGRAM(s) Oral two times a day    MEDICATIONS  (PRN):  zolpidem 5 milliGRAM(s) Oral at bedtime PRN Insomnia      Care Discussed with Consultants/Other Providers [x] YES  [ ] NO    HEALTH ISSUES - PROBLEM Dx:  Atrial fibrillation, unspecified type: Atrial fibrillation, unspecified type  Need for prophylactic measure: Need for prophylactic measure  Adrenal nodule: Adrenal nodule  Foot ulceration: Foot ulceration  Inguinal hernia: Inguinal hernia  CAD (coronary artery disease): CAD (coronary artery disease)  SIRS (systemic inflammatory response syndrome): SIRS (systemic inflammatory response syndrome)  Atrial fibrillation: Atrial fibrillation  Acute encephalopathy: Acute encephalopathy  Hypertensive urgency: Hypertensive urgency

## 2018-03-21 NOTE — PROGRESS NOTE ADULT - SUBJECTIVE AND OBJECTIVE BOX
[facility] Cardiology Consult  _________________________    CC: L sided weakness.      HPI:  72 M h/o HTN, HLD, CAD s/p CABG (10 years ago, no stents), L eye cataract presents a/w L-sided weakness, unsteadiness, and disorientation. Called EMS  himself. No chest pain or palpitations. In the  In ED, BP was  235/135, EKG showed afib with RVR, and he was febrile to  100.9 F. He was given Labetalol 10mg IVP x 4, labetalol 100mg PO, 2L bolus.     PAST MEDICAL & SURGICAL HISTORY:  Cataract: L eye  Inguinal hernia  CAD (coronary artery disease): s/p CABG  Hyperlipemia  H/O inguinal hernia repair: R sided  History of appendectomy  S/P CABG (coronary artery bypass graft)      MEDICATIONS  (STANDING):  apixaban 5 milliGRAM(s) Oral every 12 hours  aspirin enteric coated 81 milliGRAM(s) Oral daily  atorvastatin 40 milliGRAM(s) Oral at bedtime  cloNIDine 0.1 milliGRAM(s) Oral three times a day  influenza   Vaccine 0.5 milliLiter(s) IntraMuscular once  lisinopril 20 milliGRAM(s) Oral daily  metoprolol     tartrate 75 milliGRAM(s) Oral two times a day    MEDICATIONS  (PRN):  zolpidem 5 milliGRAM(s) Oral at bedtime PRN Insomnia      Allergies    No Known Allergies    Intolerances        Social Histroy: Tobacco- , ETOH-, Illicit Drugs-    T(C): 36.8 (03-21-18 @ 05:09), Max: 37.2 (03-20-18 @ 21:24)  HR: 73 (03-21-18 @ 06:39) (64 - 92)  BP: 157/84 (03-21-18 @ 06:39) (154/80 - 181/100)  RR: 18 (03-21-18 @ 06:39) (18 - 18)  SpO2: 93% (03-21-18 @ 06:39) (93% - 96%)  I&O's Summary    20 Mar 2018 07:01  -  21 Mar 2018 07:00  --------------------------------------------------------  IN: 960 mL / OUT: 200 mL / NET: 760 mL        Review of Systems:  Constitutional: [ ] Fever [ ] Chills [ ] Fatigue [ ] Weight change   HEENT: [ ] Blurred vision [ ] Eye Pain [ ] Headache [ ] Runny nose [ ] Sore Throat   Respiratory: [ ] Cough [ ] Wheezing [ ] Shortness of breath  Cardiovascular: [ ] Chest Pain [ ] Palpitations [ ] STEVENSON [ ] PND [ ] Orthopnea  Gastrointestinal: [ ] Abdominal Pain [ ] Diarrhea [ ] Constipation [ ] Hemorrhoids [ ] Nausea [ ] Vomiting  Genitourinary: [ ] Nocturia [ ] Dysuria [ ] Incontinence  Extremities: [ ] Swelling [ ] Joint Pain  Neurologic: [ ] Focal deficit [ ] Paresthesias [ ] Syncope  Lymphatic: [ ] Swelling [ ] Lymphadenopathy   Skin: [ ] Rash [ ] Ecchymoses [ ] Wounds [ ] Lesions  Psychiatry: [ ] Depression [ ] Suicidal/Homicidal Ideation [ ] Anxiety [ ] Sleep Disturbances  [ ] 10 point review of systems is otherwise negative except as mentioned above            [ ]Unable to obtain    PHYSICAL EXAM:  GENERAL: Alert, NAD  NECK: Supple, No JVD, No carotid bruit.  CHEST/LUNG: Clear to auscultation bilaterally; No wheezes, rales, or rhonchi  HEART: S1 S2 normal, irreg irreg rhythm  ABDOMEN: Soft, Nontender, Nondistended; Bowel sounds present  EXTREMITIES:  No LE edema.      LABS:                        13.9   7.51  )-----------( 154      ( 20 Mar 2018 07:57 )             43.1     03-21    141  |  104  |  25<H>  ----------------------------<  123<H>  3.8   |  21<L>  |  1.30    Ca    9.1      21 Mar 2018 06:15        CARDIAC MARKERS ( 18 Mar 2018 05:30 )  x     / <0.01 ng/mL / 332 U/L / x     / 6.5 ng/mL  CARDIAC MARKERS ( 17 Mar 2018 12:58 )  x     / <0.01 ng/mL / x     / x     / 2.9 ng/mL              MEDICATIONS  (STANDING):  apixaban 5 milliGRAM(s) Oral every 12 hours  aspirin enteric coated 81 milliGRAM(s) Oral daily  atorvastatin 40 milliGRAM(s) Oral at bedtime  cloNIDine 0.1 milliGRAM(s) Oral three times a day  influenza   Vaccine 0.5 milliLiter(s) IntraMuscular once  lisinopril 20 milliGRAM(s) Oral daily  metoprolol     tartrate 75 milliGRAM(s) Oral two times a day    MEDICATIONS  (PRN):  zolpidem 5 milliGRAM(s) Oral at bedtime PRN Insomnia      RADIOLOGY & ADDITIONAL TESTS:    Cardiology testing:  EKG: AF, PRWP, NSST.    Echo:  < from: Transthoracic Echocardiogram (03.19.18 @ 13:58) >  Patient name: MITCH RIVERA  YOB: 1945   Age: 72 (M)   MR#: [id number]  Study Date: 3/19/2018  [facility]  Study quality: Technically good  Referring Physician: Chhaya Ruiz MD  Blood Pressure: 175/92 mmHg  Height: 173 cm  Weight: 95 kg  BSA: 2.1 m2  ------------------------------------------------------------------------  PROCEDURE: Transthoracic echocardiogram with 2-D, M-Mode  and complete spectral and color flow Doppler.  INDICATION: Unspecified atrial fibrillation (I48.91),  Atherosclerotic heart disease of native coronary artery  without angina pectoris (I25.10)  ------------------------------------------------------------------------  Dimensions:    Normal Values:  LA:     4.3    2.0 - 4.0 cm  Ao:     3.2    2.0 - 3.8 cm  SEPTUM: 1.7    0.6 - 1.2 cm  PWT:    1.0    0.6 - 1.1 cm  LVIDd:  4.4    3.0 - 5.6 cm  LVIDs:         1.8 - 4.0 cm  Derived variables:  LVMI: 116 g/m2  RWT: 0.45  ------------------------------------------------------------------------  Observations:  Mitral Valve: Mitral annular calcification. Mild mitral  regurgitation.  Aortic Valve/Aorta: Thickened aortic valve. Mild aortic  regurgitation.  Aortic Root: 3.2 cm.  LVOT diameter: 2.2 cm.  Left Atrium: Severely dilated left atrium.  LA volume index  = 65 cc/m2.  Left Ventricle: Normal left ventricular systolic function.  No segmental wall motion abnormalities. Basal septal  hypertrophy. Normal diastolic function  Right Heart: Severe right atrialenlargement. Normal right  ventricular size and function. Normal tricuspid valve.  Minimal tricuspid regurgitation. Normal pulmonic valve.  Mild pulmonic regurgitation.  Pericardium/Pleura: Normal pericardium with no pericardial  effusion.  Hemodynamic: Estimated right atrial pressure is 8 mm Hg.  ------------------------------------------------------------------------  Conclusions:  1. Basal septal hypertrophy.  2. Normal left ventricular systolic function. No segmental  wall motion abnormalities.  *** No previous Echo exam.  ------------------------------------------------------------------------  Confirmed on  3/19/2018 - 16:03:50 by Samir Meyer M.D.  ------------------------------------------------------------------------    < end of copied text >    Stress Testing:    Cath:    Telemetry: AF  [facility] Cardiology Consult  _________________________    CC: L sided weakness.      HPI:  72 M h/o HTN, HLD, CAD s/p CABG (10 years ago, no stents), L eye cataract presents a/w L-sided weakness, unsteadiness, and disorientation. Called EMS  himself. No chest pain or palpitations. In the  In ED, BP was  235/135, EKG showed afib with RVR, and he was febrile to  100.9 F. He was given Labetalol 10mg IVP x 4, labetalol 100mg PO, 2L bolus.     PAST MEDICAL & SURGICAL HISTORY:  Cataract: L eye  Inguinal hernia  CAD (coronary artery disease): s/p CABG  Hyperlipemia  H/O inguinal hernia repair: R sided  History of appendectomy  S/P CABG (coronary artery bypass graft)      MEDICATIONS  (STANDING):  apixaban 5 milliGRAM(s) Oral every 12 hours  aspirin enteric coated 81 milliGRAM(s) Oral daily  atorvastatin 40 milliGRAM(s) Oral at bedtime  cloNIDine 0.1 milliGRAM(s) Oral three times a day  influenza   Vaccine 0.5 milliLiter(s) IntraMuscular once  lisinopril 20 milliGRAM(s) Oral daily  metoprolol     tartrate 75 milliGRAM(s) Oral two times a day    MEDICATIONS  (PRN):  zolpidem 5 milliGRAM(s) Oral at bedtime PRN Insomnia      Allergies    No Known Allergies    Intolerances        Social Histroy: Tobacco- , ETOH-, Illicit Drugs-    T(C): 36.8 (03-21-18 @ 05:09), Max: 37.2 (03-20-18 @ 21:24)  HR: 73 (03-21-18 @ 06:39) (64 - 92)  BP: 157/84 (03-21-18 @ 06:39) (154/80 - 181/100)  RR: 18 (03-21-18 @ 06:39) (18 - 18)  SpO2: 93% (03-21-18 @ 06:39) (93% - 96%)  I&O's Summary    20 Mar 2018 07:01  -  21 Mar 2018 07:00  --------------------------------------------------------  IN: 960 mL / OUT: 200 mL / NET: 760 mL        Review of Systems:  Constitutional: [x ] Fever [ ] Chills [ ] Fatigue [ ] Weight change   HEENT: [ ] Blurred vision [ ] Eye Pain [ ] Headache [ ] Runny nose [ ] Sore Throat   Respiratory: [ ] Cough [ ] Wheezing [ ] Shortness of breath  Cardiovascular: [ ] Chest Pain [ ] Palpitations [ ] STEVENSON [ ] PND [ ] Orthopnea  Gastrointestinal: [ ] Abdominal Pain [ ] Diarrhea [ ] Constipation [ ] Hemorrhoids [ ] Nausea [ ] Vomiting  Genitourinary: [ ] Nocturia [ ] Dysuria [ ] Incontinence  Extremities: [ ] Swelling [ ] Joint Pain  Neurologic: [ ] Focal deficit [ ] Paresthesias [ ] Syncope [x] disoriented  Lymphatic: [ ] Swelling [ ] Lymphadenopathy   Skin: [ ] Rash [ ] Ecchymoses [ ] Wounds [ ] Lesions  Psychiatry: [ ] Depression [ ] Suicidal/Homicidal Ideation [ ] Anxiety [ ] Sleep Disturbances  [x ] 10 point review of systems is otherwise negative except as mentioned above            [ ]Unable to obtain    PHYSICAL EXAM:  GENERAL: Alert, NAD  NECK: Supple, No JVD, No carotid bruit.  CHEST/LUNG: Clear to auscultation bilaterally; No wheezes, rales, or rhonchi  HEART: S1 S2 normal, irreg irreg rhythm  ABDOMEN: Soft, Nontender, Nondistended; Bowel sounds present  EXTREMITIES:  No LE edema.      LABS:                        13.9   7.51  )-----------( 154      ( 20 Mar 2018 07:57 )             43.1     03-21    141  |  104  |  25<H>  ----------------------------<  123<H>  3.8   |  21<L>  |  1.30    Ca    9.1      21 Mar 2018 06:15        CARDIAC MARKERS ( 18 Mar 2018 05:30 )  x     / <0.01 ng/mL / 332 U/L / x     / 6.5 ng/mL  CARDIAC MARKERS ( 17 Mar 2018 12:58 )  x     / <0.01 ng/mL / x     / x     / 2.9 ng/mL              MEDICATIONS  (STANDING):  apixaban 5 milliGRAM(s) Oral every 12 hours  aspirin enteric coated 81 milliGRAM(s) Oral daily  atorvastatin 40 milliGRAM(s) Oral at bedtime  cloNIDine 0.1 milliGRAM(s) Oral three times a day  influenza   Vaccine 0.5 milliLiter(s) IntraMuscular once  lisinopril 20 milliGRAM(s) Oral daily  metoprolol     tartrate 75 milliGRAM(s) Oral two times a day    MEDICATIONS  (PRN):  zolpidem 5 milliGRAM(s) Oral at bedtime PRN Insomnia      RADIOLOGY & ADDITIONAL TESTS:    Cardiology testing:  EKG: AF, PRWP, NSST.    Echo:  < from: Transthoracic Echocardiogram (03.19.18 @ 13:58) >  Patient name: MITCH RIVERA  YOB: 1945   Age: 72 (M)   MR#: [id number]  Study Date: 3/19/2018  [facility]  Study quality: Technically good  Referring Physician: Chhaya Ruiz MD  Blood Pressure: 175/92 mmHg  Height: 173 cm  Weight: 95 kg  BSA: 2.1 m2  ------------------------------------------------------------------------  PROCEDURE: Transthoracic echocardiogram with 2-D, M-Mode  and complete spectral and color flow Doppler.  INDICATION: Unspecified atrial fibrillation (I48.91),  Atherosclerotic heart disease of native coronary artery  without angina pectoris (I25.10)  ------------------------------------------------------------------------  Dimensions:    Normal Values:  LA:     4.3    2.0 - 4.0 cm  Ao:     3.2    2.0 - 3.8 cm  SEPTUM: 1.7    0.6 - 1.2 cm  PWT:    1.0    0.6 - 1.1 cm  LVIDd:  4.4    3.0 - 5.6 cm  LVIDs:         1.8 - 4.0 cm  Derived variables:  LVMI: 116 g/m2  RWT: 0.45  ------------------------------------------------------------------------  Observations:  Mitral Valve: Mitral annular calcification. Mild mitral  regurgitation.  Aortic Valve/Aorta: Thickened aortic valve. Mild aortic  regurgitation.  Aortic Root: 3.2 cm.  LVOT diameter: 2.2 cm.  Left Atrium: Severely dilated left atrium.  LA volume index  = 65 cc/m2.  Left Ventricle: Normal left ventricular systolic function.  No segmental wall motion abnormalities. Basal septal  hypertrophy. Normal diastolic function  Right Heart: Severe right atrialenlargement. Normal right  ventricular size and function. Normal tricuspid valve.  Minimal tricuspid regurgitation. Normal pulmonic valve.  Mild pulmonic regurgitation.  Pericardium/Pleura: Normal pericardium with no pericardial  effusion.  Hemodynamic: Estimated right atrial pressure is 8 mm Hg.  ------------------------------------------------------------------------  Conclusions:  1. Basal septal hypertrophy.  2. Normal left ventricular systolic function. No segmental  wall motion abnormalities.  *** No previous Echo exam.  ------------------------------------------------------------------------  Confirmed on  3/19/2018 - 16:03:50 by Samir Meyer M.D.  ------------------------------------------------------------------------    < end of copied text >      Telemetry: AF

## 2018-03-22 LAB
ANION GAP SERPL CALC-SCNC: 15 MMOL/L — SIGNIFICANT CHANGE UP (ref 5–17)
BUN SERPL-MCNC: 32 MG/DL — HIGH (ref 7–23)
CALCIUM SERPL-MCNC: 9 MG/DL — SIGNIFICANT CHANGE UP (ref 8.4–10.5)
CHLORIDE SERPL-SCNC: 107 MMOL/L — SIGNIFICANT CHANGE UP (ref 96–108)
CO2 SERPL-SCNC: 21 MMOL/L — LOW (ref 22–31)
CREAT SERPL-MCNC: 1.43 MG/DL — HIGH (ref 0.5–1.3)
CULTURE RESULTS: SIGNIFICANT CHANGE UP
CULTURE RESULTS: SIGNIFICANT CHANGE UP
GLUCOSE SERPL-MCNC: 133 MG/DL — HIGH (ref 70–99)
METANEPHRINE, PL: 40 PG/ML — SIGNIFICANT CHANGE UP (ref 0–62)
NORMETANEPHRINE, PL: 80 PG/ML — SIGNIFICANT CHANGE UP (ref 0–145)
POTASSIUM SERPL-MCNC: 3.9 MMOL/L — SIGNIFICANT CHANGE UP (ref 3.5–5.3)
POTASSIUM SERPL-SCNC: 3.9 MMOL/L — SIGNIFICANT CHANGE UP (ref 3.5–5.3)
SODIUM SERPL-SCNC: 143 MMOL/L — SIGNIFICANT CHANGE UP (ref 135–145)
SPECIMEN SOURCE: SIGNIFICANT CHANGE UP
SPECIMEN SOURCE: SIGNIFICANT CHANGE UP

## 2018-03-22 RX ORDER — ASPIRIN/CALCIUM CARB/MAGNESIUM 324 MG
2 TABLET ORAL
Qty: 0 | Refills: 0 | COMMUNITY

## 2018-03-22 RX ORDER — METOPROLOL TARTRATE 50 MG
1 TABLET ORAL
Qty: 0 | Refills: 0 | COMMUNITY

## 2018-03-22 RX ADMIN — APIXABAN 5 MILLIGRAM(S): 2.5 TABLET, FILM COATED ORAL at 17:23

## 2018-03-22 RX ADMIN — Medication 75 MILLIGRAM(S): at 07:35

## 2018-03-22 RX ADMIN — APIXABAN 5 MILLIGRAM(S): 2.5 TABLET, FILM COATED ORAL at 05:26

## 2018-03-22 RX ADMIN — Medication 0.1 MILLIGRAM(S): at 13:40

## 2018-03-22 RX ADMIN — Medication 75 MILLIGRAM(S): at 21:23

## 2018-03-22 RX ADMIN — Medication 0.1 MILLIGRAM(S): at 05:26

## 2018-03-22 RX ADMIN — ATORVASTATIN CALCIUM 40 MILLIGRAM(S): 80 TABLET, FILM COATED ORAL at 21:23

## 2018-03-22 RX ADMIN — Medication 0.1 MILLIGRAM(S): at 21:23

## 2018-03-22 RX ADMIN — LISINOPRIL 20 MILLIGRAM(S): 2.5 TABLET ORAL at 05:26

## 2018-03-22 NOTE — DISCHARGE NOTE ADULT - ADDITIONAL INSTRUCTIONS
Follow up with PMD within 1 week of discharge.  Follow up with your Cardiologist as an outpatient.  Call your Podiatrist next week for a follow up appointment.

## 2018-03-22 NOTE — PROGRESS NOTE ADULT - SUBJECTIVE AND OBJECTIVE BOX
Patient is a 72y old  Male who presents with a chief complaint of disorientation, left sided weakness (17 Mar 2018 20:33)      SUBJECTIVE / OVERNIGHT EVENTS:    Patient seen and examined. denies cp sob nvd. no acute events. no ha dizziness. BP improved.      Vital Signs Last 24 Hrs  T(C): 36.8 (22 Mar 2018 09:30), Max: 36.8 (21 Mar 2018 12:55)  T(F): 98.3 (22 Mar 2018 09:30), Max: 98.3 (21 Mar 2018 12:55)  HR: 65 (22 Mar 2018 09:30) (65 - 87)  BP: 100/64 (22 Mar 2018 09:30) (100/64 - 158/94)  BP(mean): --  RR: 18 (22 Mar 2018 09:30) (18 - 18)  SpO2: 96% (22 Mar 2018 09:30) (93% - 96%)  I&O's Summary    21 Mar 2018 07:01  -  22 Mar 2018 07:00  --------------------------------------------------------  IN: 880 mL / OUT: 0 mL / NET: 880 mL        PE:  GENERAL: NAD, AAOx3  HEAD:  Atraumatic, Normocephalic  EYES: EOMI, PERRLA, conjunctiva and sclera clear  NECK: Supple, No JVD  CHEST/LUNG: CTABL, No wheeze  HEART: Regular rate and rhythm; no murmur  ABDOMEN: Soft, Nontender, Nondistended; Bowel sounds present  EXTREMITIES:  2+ Peripheral Pulses, No clubbing, cyanosis, or edema  SKIN: No rashes or lesions  NEURO: No focal deficits    LABS:                        14.6   8.24  )-----------( 165      ( 21 Mar 2018 07:31 )             43.5     03-22    143  |  107  |  32<H>  ----------------------------<  133<H>  3.9   |  21<L>  |  1.43<H>    Ca    9.0      22 Mar 2018 06:16        CAPILLARY BLOOD GLUCOSE                RADIOLOGY & ADDITIONAL TESTS:    Imaging Personally Reviewed:  [x] YES  [ ] NO    Consultant(s) Notes Reviewed:  [x] YES  [ ] NO    MEDICATIONS  (STANDING):  apixaban 5 milliGRAM(s) Oral every 12 hours  atorvastatin 40 milliGRAM(s) Oral at bedtime  cloNIDine 0.1 milliGRAM(s) Oral three times a day  influenza   Vaccine 0.5 milliLiter(s) IntraMuscular once  lisinopril 20 milliGRAM(s) Oral daily  metoprolol     tartrate 75 milliGRAM(s) Oral two times a day    MEDICATIONS  (PRN):  zolpidem 5 milliGRAM(s) Oral at bedtime PRN Insomnia      Care Discussed with Consultants/Other Providers [x] YES  [ ] NO    HEALTH ISSUES - PROBLEM Dx:  Coronary artery disease involving native coronary artery of native heart without angina pectoris: Coronary artery disease involving native coronary artery of native heart without angina pectoris  Atrial fibrillation, unspecified type: Atrial fibrillation, unspecified type  Need for prophylactic measure: Need for prophylactic measure  Adrenal nodule: Adrenal nodule  Foot ulceration: Foot ulceration  Inguinal hernia: Inguinal hernia  CAD (coronary artery disease): CAD (coronary artery disease)  SIRS (systemic inflammatory response syndrome): SIRS (systemic inflammatory response syndrome)  Atrial fibrillation: Atrial fibrillation  Acute encephalopathy: Acute encephalopathy  Hypertensive urgency: Hypertensive urgency Patient is a 72y old  Male who presents with a chief complaint of disorientation, left sided weakness (17 Mar 2018 20:33)      SUBJECTIVE / OVERNIGHT EVENTS:    Patient seen and examined. denies cp sob nvd. no acute events. co some lightheaded. last BP borderline.      Vital Signs Last 24 Hrs  T(C): 36.8 (22 Mar 2018 09:30), Max: 36.8 (21 Mar 2018 12:55)  T(F): 98.3 (22 Mar 2018 09:30), Max: 98.3 (21 Mar 2018 12:55)  HR: 65 (22 Mar 2018 09:30) (65 - 87)  BP: 100/64 (22 Mar 2018 09:30) (100/64 - 158/94)  BP(mean): --  RR: 18 (22 Mar 2018 09:30) (18 - 18)  SpO2: 96% (22 Mar 2018 09:30) (93% - 96%)  I&O's Summary    21 Mar 2018 07:01  -  22 Mar 2018 07:00  --------------------------------------------------------  IN: 880 mL / OUT: 0 mL / NET: 880 mL        PE:  GENERAL: NAD, AAOx3  HEAD:  Atraumatic, Normocephalic  EYES: EOMI, PERRLA, conjunctiva and sclera clear  NECK: Supple, No JVD  CHEST/LUNG: CTABL, No wheeze  HEART: Regular rate and rhythm; no murmur  ABDOMEN: Soft, Nontender, Nondistended; Bowel sounds present  EXTREMITIES:  2+ Peripheral Pulses, No clubbing, cyanosis, or edema  SKIN: No rashes or lesions  NEURO: No focal deficits    LABS:                        14.6   8.24  )-----------( 165      ( 21 Mar 2018 07:31 )             43.5     03-22    143  |  107  |  32<H>  ----------------------------<  133<H>  3.9   |  21<L>  |  1.43<H>    Ca    9.0      22 Mar 2018 06:16        CAPILLARY BLOOD GLUCOSE                RADIOLOGY & ADDITIONAL TESTS:    Imaging Personally Reviewed:  [x] YES  [ ] NO    Consultant(s) Notes Reviewed:  [x] YES  [ ] NO    MEDICATIONS  (STANDING):  apixaban 5 milliGRAM(s) Oral every 12 hours  atorvastatin 40 milliGRAM(s) Oral at bedtime  cloNIDine 0.1 milliGRAM(s) Oral three times a day  influenza   Vaccine 0.5 milliLiter(s) IntraMuscular once  lisinopril 20 milliGRAM(s) Oral daily  metoprolol     tartrate 75 milliGRAM(s) Oral two times a day    MEDICATIONS  (PRN):  zolpidem 5 milliGRAM(s) Oral at bedtime PRN Insomnia      Care Discussed with Consultants/Other Providers [x] YES  [ ] NO    HEALTH ISSUES - PROBLEM Dx:  Coronary artery disease involving native coronary artery of native heart without angina pectoris: Coronary artery disease involving native coronary artery of native heart without angina pectoris  Atrial fibrillation, unspecified type: Atrial fibrillation, unspecified type  Need for prophylactic measure: Need for prophylactic measure  Adrenal nodule: Adrenal nodule  Foot ulceration: Foot ulceration  Inguinal hernia: Inguinal hernia  CAD (coronary artery disease): CAD (coronary artery disease)  SIRS (systemic inflammatory response syndrome): SIRS (systemic inflammatory response syndrome)  Atrial fibrillation: Atrial fibrillation  Acute encephalopathy: Acute encephalopathy  Hypertensive urgency: Hypertensive urgency

## 2018-03-22 NOTE — PROGRESS NOTE ADULT - PROBLEM SELECTOR PLAN 3
-  - cont BB and ACE-I  - cont statin.    will follow.    Corky Guardado M.D., [facility]  [phone]

## 2018-03-22 NOTE — DISCHARGE NOTE ADULT - PROVIDER TOKENS
TOKEN:'[id number]',[id number]' TOKEN:'[id number]',[id number]'

## 2018-03-22 NOTE — DISCHARGE NOTE ADULT - OTHER SIGNIFICANT FINDINGS
Attending DC note:  72-yo male w/PMHx of HTN, HLD, CAD s/p CABG 10 yrs ago, presented with HTN urgency and SIRS of noninfectious origin.  Neuro was consulted for possibility of PRES but they said no it is not this, and MRI not needed.  Ct head was nonacute and CT abd/pelvis revealed a large left inguinal hernia which surgery isn't touching here because he is asymptomatic.  Found to have new afib, started on eliquis and remains on metoprolol. BP medication titrated to clonidine, metoprolol and lisinopril. insurance will not cover eliquis or xarelto. patient will fu with cardiology in office for further recs.

## 2018-03-22 NOTE — PROGRESS NOTE ADULT - PROBLEM SELECTOR PLAN 9
eliquis    dispo: DC home with home PT eliquis    dispo: will observe another 24 hrs for BP monitoring

## 2018-03-22 NOTE — DISCHARGE NOTE ADULT - CARE PLAN
Principal Discharge DX:	Hypertensive urgency  Goal:	Patient remains hemodynamically stable.  Assessment and plan of treatment:	Low salt diet  Activity as tolerated.  Take all medication as prescribed.  Follow up with your medical doctor for routine blood pressure monitoring at your next visit.  Notify your doctor if you have any of the following symptoms:   Dizziness, Lightheadedness, Blurry vision, Headache, Chest pain, Shortness of breath  Secondary Diagnosis:	Atrial fibrillation  Assessment and plan of treatment:	Atrial fibrillation is the most common heart rhythm problem & has the risk of stroke & heart attack  It helps if you control your blood pressure, not drink more than 1-2 alcohol drinks per day, cut down on caffeine, getting treatment for over active thyroid gland, & getting exercise  Call your doctor if you feel your heart racing or beating unusually, chest tightness or pain, lightheaded, faint, shortness of breath especially with exercise  It is important to take your heart medication as prescribed  You may be on anticoagulation which is very important to take as directed - you may need blood work to monitor drug levels  Secondary Diagnosis:	CAD (coronary artery disease)  Assessment and plan of treatment:	Coronary artery disease is a condition where the arteries the supply the heart muscle get clogges with fatty deposits & puts you at risk for a heart attack  Call your doctor if you have any new pain, pressure, or discomfort in the center of your chest, pain, tingling or discomfort in arms, back, neck, jaw, or stomach, shortness of breath, nausea, vomiting, burping or heartburn, sweating, cold and clammy skin, racing or abnormal heartbeat for more than 10 minutes or if they keep coming & going.  Call 911 and do not tr to get to hospital by care  You can help yourself with lefestyle changes (quitting smoking if you smoke), eat lots of fruits & vegetables & low fat dairy products, not a lot of meat & fatty foods, walk or some form of physical activity most days of the week, lose weight if you are overweight  Take your cardiac medication as prescribed to lower cholesterol, to lower blood pressure, aspirin to prevent blood clots, and diabetes control  Make sure to keep appointments with doctor for cardiac follow up care  Secondary Diagnosis:	Hyperlipemia  Assessment and plan of treatment:	stable, continue with current medications.  Secondary Diagnosis:	Foot ulceration  Assessment and plan of treatment:	follow up with Podiatrist as an outpatient.  Continue with foot dressing changes as recommended by Podiatrist.  Secondary Diagnosis:	H/O inguinal hernia repair  Assessment and plan of treatment:	Follow up with Dr. Thomas,  for elective hernia repair. Principal Discharge DX:	Hypertensive urgency  Goal:	Patient remains hemodynamically stable.  Assessment and plan of treatment:	Low salt diet  Activity as tolerated.  Take all medication as prescribed.  Follow up with your medical doctor for routine blood pressure monitoring at your next visit.  Notify your doctor if you have any of the following symptoms:   Dizziness, Lightheadedness, Blurry vision, Headache, Chest pain, Shortness of breath  HCTZ was discontinued, Lopressor dose changed, clonidine added to the regimens  Secondary Diagnosis:	Atrial fibrillation  Goal:	stable  Assessment and plan of treatment:	Atrial fibrillation is the most common heart rhythm problem & has the risk of stroke & heart attack  It helps if you control your blood pressure, not drink more than 1-2 alcohol drinks per day, cut down on caffeine, getting treatment for over active thyroid gland, & getting exercise  Call your doctor if you feel your heart racing or beating unusually, chest tightness or pain, lightheaded, faint, shortness of breath especially with exercise  It is important to take your heart medication as prescribed  You may be on anticoagulation which is very important to take as directed - you may need blood work to monitor drug levels  Secondary Diagnosis:	CAD (coronary artery disease)  Goal:	stable                                                                                                                                                                                                                                                                                                                                                                                                                                                                                                                                      stable  Assessment and plan of treatment:	Coronary artery disease is a condition where the arteries the supply the heart muscle get cloggs with fatty deposits & puts you at risk for a heart attack  Call your doctor if you have any new pain, pressure, or discomfort in the center of your chest, pain, tingling or discomfort in arms, back, neck, jaw, or stomach, shortness of breath, nausea, vomiting, burping or heartburn, sweating, cold and clammy skin, racing or abnormal heartbeat for more than 10 minutes or if they keep coming & going.  Call 911 and do not tr to get to hospital by care  You can help yourself with lefestyle changes (quitting smoking if you smoke), eat lots of fruits & vegetables & low fat dairy products, not a lot of meat & fatty foods, walk or some form of physical activity most days of the week, lose weight if you are overweight  Take your cardiac medication as prescribed to lower cholesterol, to lower blood pressure, aspirin to prevent blood clots, and diabetes control  Make sure to keep appointments with doctor for cardiac follow up care  Aspirin was discontinued  Secondary Diagnosis:	Hyperlipemia  Goal:	stable  Assessment and plan of treatment:	stable, continue with current medications.  Secondary Diagnosis:	Foot ulceration  Goal:	stable  Assessment and plan of treatment:	follow up with Podiatrist as an outpatient.  Continue with foot dressing changes as recommended by Podiatrist.  Secondary Diagnosis:	H/O inguinal hernia repair  Assessment and plan of treatment:	Follow up with Dr. Thomas,  for elective hernia repair.  Secondary Diagnosis:	SIRS (systemic inflammatory response syndrome)  Goal:	resolved  Assessment and plan of treatment:	Antibiotics was discontinued, blood culture was negative.

## 2018-03-22 NOTE — DISCHARGE NOTE ADULT - PATIENT PORTAL LINK FT
You can access the [facility] Patient Portal, offered by [facility], by registering with the following website: [email or web address]

## 2018-03-22 NOTE — PROGRESS NOTE ADULT - PROBLEM SELECTOR PLAN 1
cont clonidine 0.1mg TID, lisinopril 20mg qd, lopressor 75mg BID  appreciate cardio recs cont clonidine 0.1mg TID, lisinopril 20mg qd, lopressor 75mg BID  monitor BP for additional 24 hrs as BP borderline  appreciate cardio recs

## 2018-03-22 NOTE — DISCHARGE NOTE ADULT - CARE PROVIDER_API CALL
Andrew Flower), Cardiology; Internal Medicine  [address] [facility] [address] [facility] [address]  [address]  [place] [address]  Phone: [phone]  Fax: [phone]    Salvatore Leavitt (MARIE), Podiatric Medicine and Surgery  [address] [place] [address]  Phone: [phone]  Fax: [phone]    Kavon Thomas), Surgery  [address] [facility] [address] [facility] [address]  [place], [place] [id number]  Phone: [phone]  Fax: [phone] Andrew Flower), Cardiology; Internal Medicine  [address] [facility]  [address]  [place] [address]  Phone: [phone]  Fax: [phone]    Salvatore Leavitt (MARIE), Podiatric Medicine and Surgery  [address] [place] [address]  Phone: [phone]  Fax: [phone]    Kavon Thomas), Surgery  [address] [facility]  [address]  [place], [place] [address]  Phone: [phone]  Fax: [phone]    Deovn Bolden), Internal Medicine  [address] [facility]  [address]  [place] [address]  Phone: [phone]  Fax: [phone]

## 2018-03-22 NOTE — PROGRESS NOTE ADULT - SUBJECTIVE AND OBJECTIVE BOX
[facility] Cardiology Progress Note  _______________________________    Pt. seen and examined. No new cardiac-related complaints.    Telemetry - Afib 70-90's.    T(C): 36.7 (03-22-18 @ 04:45), Max: 36.8 (03-21-18 @ 12:55)  HR: 74 (03-22-18 @ 06:49) (70 - 87)  BP: 127/85 (03-22-18 @ 06:49) (126/75 - 158/94)  RR: 18 (03-22-18 @ 06:49) (18 - 18)  SpO2: 93% (03-22-18 @ 06:49) (93% - 95%)  I&O's Summary    21 Mar 2018 07:01  -  22 Mar 2018 07:00  --------------------------------------------------------  IN: 880 mL / OUT: 0 mL / NET: 880 mL        PHYSICAL EXAM:  GENERAL: Alert, NAD  NECK: Supple, No JVD, No carotid bruit.  CHEST/LUNG: Clear to auscultation bilaterally; No wheezes, rales, or rhonchi  HEART: S1 S2 normal, irreg irreg rhythm  ABDOMEN: Soft, Nontender, Nondistended; Bowel sounds present  EXTREMITIES:  No LE edema.    LABS:                        14.6   8.24  )-----------( 165      ( 21 Mar 2018 07:31 )             43.5     03-22    143  |  107  |  32<H>  ----------------------------<  133<H>  3.9   |  21<L>  |  1.43<H>    Ca    9.0      22 Mar 2018 06:16        CARDIAC MARKERS ( 18 Mar 2018 05:30 )  x     / <0.01 ng/mL / 332 U/L / x     / 6.5 ng/mL  CARDIAC MARKERS ( 17 Mar 2018 12:58 )  x     / <0.01 ng/mL / x     / x     / 2.9 ng/mL          MEDICATIONS  (STANDING):  apixaban 5 milliGRAM(s) Oral every 12 hours  atorvastatin 40 milliGRAM(s) Oral at bedtime  cloNIDine 0.1 milliGRAM(s) Oral three times a day  influenza   Vaccine 0.5 milliLiter(s) IntraMuscular once  lisinopril 20 milliGRAM(s) Oral daily  metoprolol     tartrate 75 milliGRAM(s) Oral two times a day    MEDICATIONS  (PRN):  zolpidem 5 milliGRAM(s) Oral at bedtime PRN Insomnia      RADIOLOGY & ADDITIONAL TESTS:

## 2018-03-22 NOTE — PROGRESS NOTE ADULT - PROBLEM SELECTOR PLAN 2
-  - bp better controlled.  - cont current antihypertensive regimen - clonidine, lisinopril, metoprolol.

## 2018-03-22 NOTE — PROGRESS NOTE ADULT - PROBLEM SELECTOR PLAN 1
-  - rates acceptable.  - cont lopressor.  - echo report previously reviewed.  - Cont eliquis 5 mg po BID.

## 2018-03-22 NOTE — DISCHARGE NOTE ADULT - CARE PROVIDERS DIRECT ADDRESSES
,DirectAddress_Unknown,[email or web address] ,DirectAddress_Unknown,[email or web address],DirectAddress_Unknown

## 2018-03-22 NOTE — DISCHARGE NOTE ADULT - SECONDARY DIAGNOSIS.
Atrial fibrillation CAD (coronary artery disease) Hyperlipemia Foot ulceration H/O inguinal hernia repair SIRS (systemic inflammatory response syndrome)

## 2018-03-22 NOTE — DISCHARGE NOTE ADULT - MEDICATION SUMMARY - MEDICATIONS TO STOP TAKING
I will STOP taking the medications listed below when I get home from the hospital:    hydroCHLOROthiazide 25 mg oral tablet  -- 1 tab(s) by mouth once a day    aspirin 81 mg oral delayed release capsule  -- 2 tab(s) by mouth once a day

## 2018-03-22 NOTE — PROGRESS NOTE ADULT - PROBLEM SELECTOR PLAN 4
unlikely infectious, but related to new onset afib and hypertensive urgency  blood cx 3/17 negative so far  podiatry consult appreciated, unlikely to have osteomyelitis of 1st toes

## 2018-03-22 NOTE — DISCHARGE NOTE ADULT - PLAN OF CARE
Patient remains hemodynamically stable. Low salt diet  Activity as tolerated.  Take all medication as prescribed.  Follow up with your medical doctor for routine blood pressure monitoring at your next visit.  Notify your doctor if you have any of the following symptoms:   Dizziness, Lightheadedness, Blurry vision, Headache, Chest pain, Shortness of breath Atrial fibrillation is the most common heart rhythm problem & has the risk of stroke & heart attack  It helps if you control your blood pressure, not drink more than 1-2 alcohol drinks per day, cut down on caffeine, getting treatment for over active thyroid gland, & getting exercise  Call your doctor if you feel your heart racing or beating unusually, chest tightness or pain, lightheaded, faint, shortness of breath especially with exercise  It is important to take your heart medication as prescribed  You may be on anticoagulation which is very important to take as directed - you may need blood work to monitor drug levels Coronary artery disease is a condition where the arteries the supply the heart muscle get clogges with fatty deposits & puts you at risk for a heart attack  Call your doctor if you have any new pain, pressure, or discomfort in the center of your chest, pain, tingling or discomfort in arms, back, neck, jaw, or stomach, shortness of breath, nausea, vomiting, burping or heartburn, sweating, cold and clammy skin, racing or abnormal heartbeat for more than 10 minutes or if they keep coming & going.  Call 911 and do not tr to get to hospital by care  You can help yourself with lefestyle changes (quitting smoking if you smoke), eat lots of fruits & vegetables & low fat dairy products, not a lot of meat & fatty foods, walk or some form of physical activity most days of the week, lose weight if you are overweight  Take your cardiac medication as prescribed to lower cholesterol, to lower blood pressure, aspirin to prevent blood clots, and diabetes control  Make sure to keep appointments with doctor for cardiac follow up care stable, continue with current medications. follow up with Podiatrist as an outpatient.  Continue with foot dressing changes as recommended by Podiatrist. Follow up with Dr. Thomas,  for elective hernia repair. Low salt diet  Activity as tolerated.  Take all medication as prescribed.  Follow up with your medical doctor for routine blood pressure monitoring at your next visit.  Notify your doctor if you have any of the following symptoms:   Dizziness, Lightheadedness, Blurry vision, Headache, Chest pain, Shortness of breath  HCTZ was discontinued, Lopressor dose changed, clonidine added to the regimens stable stable                                                                                                                                                                                                                                                                                                                                                                                                                                                                                                                                      stable Coronary artery disease is a condition where the arteries the supply the heart muscle get cloggs with fatty deposits & puts you at risk for a heart attack  Call your doctor if you have any new pain, pressure, or discomfort in the center of your chest, pain, tingling or discomfort in arms, back, neck, jaw, or stomach, shortness of breath, nausea, vomiting, burping or heartburn, sweating, cold and clammy skin, racing or abnormal heartbeat for more than 10 minutes or if they keep coming & going.  Call 911 and do not tr to get to hospital by care  You can help yourself with lefestyle changes (quitting smoking if you smoke), eat lots of fruits & vegetables & low fat dairy products, not a lot of meat & fatty foods, walk or some form of physical activity most days of the week, lose weight if you are overweight  Take your cardiac medication as prescribed to lower cholesterol, to lower blood pressure, aspirin to prevent blood clots, and diabetes control  Make sure to keep appointments with doctor for cardiac follow up care  Aspirin was discontinued resolved Antibiotics was discontinued, blood culture was negative.

## 2018-03-22 NOTE — DISCHARGE NOTE ADULT - HOSPITAL COURSE
72M PMH HTN, HLD, CAD/CABG (10 years ago, no stents), L eye cataract presents with left sided weakness and "disorientation." States he woke up and felt disoriented and he didn't understand where he was. Could not accomplish simple tasks. He noticed a difference in strength between his arms/legs, with the left side being perhaps slightly weaker. Called the ambulance himself. He reports feeling unsteady on his feet as well as slightly confused. Patient is somewhat of a poor historian. Denies chest pain, SOB, NVD, abdominal pain, URI symptoms, fevers/chills at home, cough, dizziness, dysuria, frequency, LE edema. He has chronic wounds of b/l great toes which he has been taping for two years. His scrotum is significantly enlarged due to a large L inguinal hernia.    In ED, BP was very elevated to 235/135, -120 afib RVR, Temp 100.9 rectal, RR 16 O2sat 100% on RA. He was given labetalol 10mg X4 IVP, labetalol 100mg oral, 2L bolus. He had elevated lactate to 4.4 which did not improve with IVF.   He was started with Clonidine, increased the dose of Lopressor, cont Lisinopril, HCTZ was discontinued. He was also given Antibiotics for SIRS/encephalopathy, blood culture was negative and Antibiotics was discontinued, aspirin was also discontinued. He was seen by card, podiatry, gen surgery and PT. Over the days he responded well, mentation improved and PT recomm home with home PT. He is hemodynamically stable to be discharged home today. Spoke to Attending. 72M PMH HTN, HLD, CAD/CABG (10 years ago, no stents), L eye cataract presents with left sided weakness and "disorientation." States he woke up and felt disoriented and he didn't understand where he was. Could not accomplish simple tasks. He noticed a difference in strength between his arms/legs, with the left side being perhaps slightly weaker. Called the ambulance himself. He reports feeling unsteady on his feet as well as slightly confused. Patient is somewhat of a poor historian. Denies chest pain, SOB, NVD, abdominal pain, URI symptoms, fevers/chills at home, cough, dizziness, dysuria, frequency, LE edema. He has chronic wounds of b/l great toes which he has been taping for two years. His scrotum is significantly enlarged due to a large L inguinal hernia.    In ED, BP was very elevated to 235/135, -120 afib RVR, Temp 100.9 rectal, RR 16 O2sat 100% on RA. He was given labetalol 10mg X4 IVP, labetalol 100mg oral, 2L bolus. He had elevated lactate to 4.4 which did not improve with IVF.   He was started with iv and po Labetalol, iv and po Hydralazine, Clonidine, increased the dose of Lopressor, continued on  Lisinopril, HCTZ was discontinued. He was also given Antibiotics for SIRS/encephalopathy, blood culture was negative and Antibiotics was discontinued, aspirin was also discontinued. He was seen by card, podiatry, gen surgery and PT. Over the days he responded well, mentation improved and PT recomm home with home PT. He is hemodynamically stable to be discharged home today. Spoke to Attending. 72M PMH HTN, HLD, CAD/CABG (10 years ago, no stents), L eye cataract presents with left sided weakness and "disorientation." States he woke up and felt disoriented and he didn't understand where he was. Could not accomplish simple tasks. He noticed a difference in strength between his arms/legs, with the left side being perhaps slightly weaker. Called the ambulance himself. He reports feeling unsteady on his feet as well as slightly confused. Patient is somewhat of a poor historian. Denies chest pain, SOB, NVD, abdominal pain, URI symptoms, fevers/chills at home, cough, dizziness, dysuria, frequency, LE edema. He has chronic wounds of b/l great toes which he has been taping for two years. His scrotum is significantly enlarged due to a large L inguinal hernia.    In ED, BP was very elevated to 235/135, -120 afib RVR, Temp 100.9 rectal, RR 16 O2sat 100% on RA. He was given labetalol 10mg X4 IVP, labetalol 100mg oral, 2L bolus. He had elevated lactate to 4.4 which did not improve with IVF.   He was started with iv and po Labetalol, iv and po Hydralazine, Clonidine, increased the dose of Lopressor, continued on  Lisinopril, HCTZ was discontinued. He was also given Antibiotics for SIRS/encephalopathy, blood culture was negative and Antibiotics was discontinued, aspirin was also discontinued. He was seen by card, podiatry, gen surgery and PT. Over the days he responded well, mentation improved and PT recomm home with home PT. He is hemodynamically stable to be discharged home today. Spoke to Attending.  D/W AMY, TERESA on the floor.

## 2018-03-22 NOTE — DISCHARGE NOTE ADULT - MEDICATION SUMMARY - MEDICATIONS TO TAKE
I will START or STAY ON the medications listed below when I get home from the hospital:    lisinopril 20 mg oral tablet  -- 1 tab(s) by mouth once a day  -- Indication: For Hypertensive urgency    cloNIDine 0.1 mg oral tablet  -- 1 tab(s) by mouth 3 times a day  -- Indication: For Hypertensive urgency    apixaban 5 mg oral tablet  -- 1 tab(s) by mouth every 12 hours  -- Indication: For Atrial fibrillation, unspecified type    atorvastatin 40 mg oral tablet  -- 1 tab(s) by mouth once a day (at bedtime)  -- Indication: For Hyperlipemia    metoprolol tartrate 75 mg oral tablet  -- 1 tab(s) by mouth 2 times a day  -- Indication: For Hypertensive urgency

## 2018-03-23 VITALS
OXYGEN SATURATION: 95 % | RESPIRATION RATE: 18 BRPM | TEMPERATURE: 98 F | DIASTOLIC BLOOD PRESSURE: 82 MMHG | SYSTOLIC BLOOD PRESSURE: 140 MMHG | HEART RATE: 60 BPM

## 2018-03-23 PROCEDURE — 96374 THER/PROPH/DIAG INJ IV PUSH: CPT

## 2018-03-23 PROCEDURE — 82330 ASSAY OF CALCIUM: CPT

## 2018-03-23 PROCEDURE — 85610 PROTHROMBIN TIME: CPT

## 2018-03-23 PROCEDURE — 82550 ASSAY OF CK (CPK): CPT

## 2018-03-23 PROCEDURE — 82435 ASSAY OF BLOOD CHLORIDE: CPT

## 2018-03-23 PROCEDURE — 93306 TTE W/DOPPLER COMPLETE: CPT

## 2018-03-23 PROCEDURE — 71045 X-RAY EXAM CHEST 1 VIEW: CPT

## 2018-03-23 PROCEDURE — 93005 ELECTROCARDIOGRAM TRACING: CPT

## 2018-03-23 PROCEDURE — 85014 HEMATOCRIT: CPT

## 2018-03-23 PROCEDURE — 80048 BASIC METABOLIC PNL TOTAL CA: CPT

## 2018-03-23 PROCEDURE — 96376 TX/PRO/DX INJ SAME DRUG ADON: CPT

## 2018-03-23 PROCEDURE — 83036 HEMOGLOBIN GLYCOSYLATED A1C: CPT

## 2018-03-23 PROCEDURE — 84132 ASSAY OF SERUM POTASSIUM: CPT

## 2018-03-23 PROCEDURE — 84100 ASSAY OF PHOSPHORUS: CPT

## 2018-03-23 PROCEDURE — 86140 C-REACTIVE PROTEIN: CPT

## 2018-03-23 PROCEDURE — 73630 X-RAY EXAM OF FOOT: CPT

## 2018-03-23 PROCEDURE — 83605 ASSAY OF LACTIC ACID: CPT

## 2018-03-23 PROCEDURE — 74177 CT ABD & PELVIS W/CONTRAST: CPT

## 2018-03-23 PROCEDURE — 87040 BLOOD CULTURE FOR BACTERIA: CPT

## 2018-03-23 PROCEDURE — 84295 ASSAY OF SERUM SODIUM: CPT

## 2018-03-23 PROCEDURE — 96375 TX/PRO/DX INJ NEW DRUG ADDON: CPT

## 2018-03-23 PROCEDURE — 83835 ASSAY OF METANEPHRINES: CPT

## 2018-03-23 PROCEDURE — 83735 ASSAY OF MAGNESIUM: CPT

## 2018-03-23 PROCEDURE — 87486 CHLMYD PNEUM DNA AMP PROBE: CPT

## 2018-03-23 PROCEDURE — 84484 ASSAY OF TROPONIN QUANT: CPT

## 2018-03-23 PROCEDURE — 87798 DETECT AGENT NOS DNA AMP: CPT

## 2018-03-23 PROCEDURE — 87581 M.PNEUMON DNA AMP PROBE: CPT

## 2018-03-23 PROCEDURE — 93923 UPR/LXTR ART STDY 3+ LVLS: CPT

## 2018-03-23 PROCEDURE — 85730 THROMBOPLASTIN TIME PARTIAL: CPT

## 2018-03-23 PROCEDURE — 97161 PT EVAL LOW COMPLEX 20 MIN: CPT

## 2018-03-23 PROCEDURE — 87633 RESP VIRUS 12-25 TARGETS: CPT

## 2018-03-23 PROCEDURE — 99285 EMERGENCY DEPT VISIT HI MDM: CPT | Mod: 25

## 2018-03-23 PROCEDURE — 99291 CRITICAL CARE FIRST HOUR: CPT | Mod: 25

## 2018-03-23 PROCEDURE — 84145 PROCALCITONIN (PCT): CPT

## 2018-03-23 PROCEDURE — 80053 COMPREHEN METABOLIC PANEL: CPT

## 2018-03-23 PROCEDURE — 82088 ASSAY OF ALDOSTERONE: CPT

## 2018-03-23 PROCEDURE — 84244 ASSAY OF RENIN: CPT

## 2018-03-23 PROCEDURE — 82947 ASSAY GLUCOSE BLOOD QUANT: CPT

## 2018-03-23 PROCEDURE — 85652 RBC SED RATE AUTOMATED: CPT

## 2018-03-23 PROCEDURE — 87086 URINE CULTURE/COLONY COUNT: CPT

## 2018-03-23 PROCEDURE — 82803 BLOOD GASES ANY COMBINATION: CPT

## 2018-03-23 PROCEDURE — 82553 CREATINE MB FRACTION: CPT

## 2018-03-23 PROCEDURE — 85027 COMPLETE CBC AUTOMATED: CPT

## 2018-03-23 PROCEDURE — 81001 URINALYSIS AUTO W/SCOPE: CPT

## 2018-03-23 PROCEDURE — 70450 CT HEAD/BRAIN W/O DYE: CPT

## 2018-03-23 PROCEDURE — 80202 ASSAY OF VANCOMYCIN: CPT

## 2018-03-23 RX ORDER — ATORVASTATIN CALCIUM 80 MG/1
1 TABLET, FILM COATED ORAL
Qty: 30 | Refills: 0 | OUTPATIENT
Start: 2018-03-23 | End: 2018-04-21

## 2018-03-23 RX ORDER — METOPROLOL TARTRATE 50 MG
1 TABLET ORAL
Qty: 60 | Refills: 0 | OUTPATIENT
Start: 2018-03-23 | End: 2018-04-21

## 2018-03-23 RX ORDER — FONDAPARINUX SODIUM 2.5 MG/.5ML
4 INJECTION, SOLUTION SUBCUTANEOUS
Qty: 120 | Refills: 0 | OUTPATIENT
Start: 2018-03-23 | End: 2018-04-21

## 2018-03-23 RX ORDER — APIXABAN 2.5 MG/1
1 TABLET, FILM COATED ORAL
Qty: 60 | Refills: 0 | OUTPATIENT
Start: 2018-03-23 | End: 2018-04-21

## 2018-03-23 RX ORDER — LISINOPRIL 2.5 MG/1
1 TABLET ORAL
Qty: 30 | Refills: 0 | OUTPATIENT
Start: 2018-03-23 | End: 2018-04-21

## 2018-03-23 RX ADMIN — LISINOPRIL 20 MILLIGRAM(S): 2.5 TABLET ORAL at 06:37

## 2018-03-23 RX ADMIN — Medication 0.1 MILLIGRAM(S): at 06:37

## 2018-03-23 RX ADMIN — APIXABAN 5 MILLIGRAM(S): 2.5 TABLET, FILM COATED ORAL at 06:37

## 2018-03-23 RX ADMIN — Medication 75 MILLIGRAM(S): at 06:37

## 2018-03-23 NOTE — PROGRESS NOTE ADULT - PROBLEM SELECTOR PROBLEM 4
SIRS (systemic inflammatory response syndrome)

## 2018-03-23 NOTE — PROGRESS NOTE ADULT - PROBLEM SELECTOR PROBLEM 1
Atrial fibrillation, unspecified type
Hypertensive urgency

## 2018-03-23 NOTE — PROGRESS NOTE ADULT - PROBLEM SELECTOR PROBLEM 2
Hypertensive urgency
Acute encephalopathy
Hypertensive urgency
Hypertensive urgency
Acute encephalopathy
Acute encephalopathy

## 2018-03-23 NOTE — PROGRESS NOTE ADULT - PROBLEM SELECTOR PLAN 6
--massive L inguinal hernia, not incarcerated. Reportedly evaluated by surgery and not incarcerated/strangulated. Patient believes he had a BM yesterday, passing gas. --no signs of incarceration on CT abdomen  --continue to monitor serial exams
massive L inguinal hernia, not incarcerated. Reportedly evaluated by surgery and not incarcerated/strangulated. Patient believes he had a BM yesterday, passing gas. no signs of incarceration on CT abdomen  continue outpt fu
--massive L inguinal hernia, not incarcerated. Reportedly evaluated by surgery and not incarcerated/strangulated. Patient believes he had a BM yesterday, passing gas. --no signs of incarceration on CT abdomen  --continue to monitor serial exams
--massive L inguinal hernia, not incarcerated. Reportedly evaluated by surgery and not incarcerated/strangulated. Patient believes he had a BM yesterday, passing gas. --no signs of incarceration on CT abdomen  --continue to monitor serial exams
massive L inguinal hernia, not incarcerated. Reportedly evaluated by surgery and not incarcerated/strangulated. Patient believes he had a BM yesterday, passing gas. no signs of incarceration on CT abdomen  continue outpt fu
massive L inguinal hernia, not incarcerated. Reportedly evaluated by surgery and not incarcerated/strangulated. Patient believes he had a BM yesterday, passing gas. no signs of incarceration on CT abdomen  continue to monitor serial exams

## 2018-03-23 NOTE — PROGRESS NOTE ADULT - PROBLEM SELECTOR PLAN 7
--patient needs podiatry evaluation in the morning  Xray feet pending
Appreciate podiatry
--patient needs podiatry evaluation in the morning  Xray feet pending
Appreciate podiatry

## 2018-03-23 NOTE — PROGRESS NOTE ADULT - PROBLEM SELECTOR PLAN 1
cont clonidine 0.1mg TID, lisinopril 20mg qd, lopressor 75mg BID  monitor BP for additional 24 hrs as BP borderline  appreciate cardio recs cont clonidine 0.1mg TID, lisinopril 20mg qd, lopressor 75mg BID  appreciate cardio recs

## 2018-03-23 NOTE — PROGRESS NOTE ADULT - PROBLEM SELECTOR PLAN 3
-  - cont BB and ACE-I  - cont statin.    will follow.    Corky Guardado M.D., [facility]  [phone]

## 2018-03-23 NOTE — PROGRESS NOTE ADULT - SUBJECTIVE AND OBJECTIVE BOX
Patient is a 72y old  Male who presents with a chief complaint of disorientation, left sided weakness (22 Mar 2018 10:47)      SUBJECTIVE / OVERNIGHT EVENTS:    Patient seen and examined.       Vital Signs Last 24 Hrs  T(C): 36.5 (23 Mar 2018 10:36), Max: 36.8 (22 Mar 2018 20:38)  T(F): 97.7 (23 Mar 2018 10:36), Max: 98.3 (22 Mar 2018 20:38)  HR: 60 (23 Mar 2018 10:36) (60 - 83)  BP: 140/82 (23 Mar 2018 10:36) (132/95 - 152/87)  BP(mean): --  RR: 18 (23 Mar 2018 10:36) (18 - 18)  SpO2: 95% (23 Mar 2018 10:36) (94% - 95%)  I&O's Summary    22 Mar 2018 07:01  -  23 Mar 2018 07:00  --------------------------------------------------------  IN: 1150 mL / OUT: 0 mL / NET: 1150 mL        PE:  GENERAL: NAD, AAOx3  HEAD:  Atraumatic, Normocephalic  EYES: EOMI, PERRLA, conjunctiva and sclera clear  NECK: Supple, No JVD  CHEST/LUNG: CTABL, No wheeze  HEART: Regular rate and rhythm; + murmur  ABDOMEN: Soft, Nontender, Nondistended; Bowel sounds present  EXTREMITIES:  2+ Peripheral Pulses, No clubbing, cyanosis, or edema  SKIN: No rashes or lesions  NEURO: No focal deficits    LABS:    03-22    143  |  107  |  32<H>  ----------------------------<  133<H>  3.9   |  21<L>  |  1.43<H>    Ca    9.0      22 Mar 2018 06:16        CAPILLARY BLOOD GLUCOSE                RADIOLOGY & ADDITIONAL TESTS:    Imaging Personally Reviewed:  [x] YES  [ ] NO    Consultant(s) Notes Reviewed:  [x] YES  [ ] NO    MEDICATIONS  (STANDING):  apixaban 5 milliGRAM(s) Oral every 12 hours  atorvastatin 40 milliGRAM(s) Oral at bedtime  cloNIDine 0.1 milliGRAM(s) Oral three times a day  influenza   Vaccine 0.5 milliLiter(s) IntraMuscular once  lisinopril 20 milliGRAM(s) Oral daily  metoprolol     tartrate 75 milliGRAM(s) Oral two times a day    MEDICATIONS  (PRN):  zolpidem 5 milliGRAM(s) Oral at bedtime PRN Insomnia      Care Discussed with Consultants/Other Providers [x] YES  [ ] NO    HEALTH ISSUES - PROBLEM Dx:  Coronary artery disease involving native coronary artery of native heart without angina pectoris: Coronary artery disease involving native coronary artery of native heart without angina pectoris  Atrial fibrillation, unspecified type: Atrial fibrillation, unspecified type  Need for prophylactic measure: Need for prophylactic measure  Adrenal nodule: Adrenal nodule  Foot ulceration: Foot ulceration  Inguinal hernia: Inguinal hernia  CAD (coronary artery disease): CAD (coronary artery disease)  SIRS (systemic inflammatory response syndrome): SIRS (systemic inflammatory response syndrome)  Atrial fibrillation: Atrial fibrillation  Acute encephalopathy: Acute encephalopathy  Hypertensive urgency: Hypertensive urgency Patient is a 72y old  Male who presents with a chief complaint of disorientation, left sided weakness (22 Mar 2018 10:47)      SUBJECTIVE / OVERNIGHT EVENTS:    Patient seen and examined. feels unsteady when ambulating but ambulated without walker with PT. denies cp sob nvd headache dizziness.      Vital Signs Last 24 Hrs  T(C): 36.5 (23 Mar 2018 10:36), Max: 36.8 (22 Mar 2018 20:38)  T(F): 97.7 (23 Mar 2018 10:36), Max: 98.3 (22 Mar 2018 20:38)  HR: 60 (23 Mar 2018 10:36) (60 - 83)  BP: 140/82 (23 Mar 2018 10:36) (132/95 - 152/87)  BP(mean): --  RR: 18 (23 Mar 2018 10:36) (18 - 18)  SpO2: 95% (23 Mar 2018 10:36) (94% - 95%)  I&O's Summary    22 Mar 2018 07:01  -  23 Mar 2018 07:00  --------------------------------------------------------  IN: 1150 mL / OUT: 0 mL / NET: 1150 mL        PE:  GENERAL: NAD, AAOx3  HEAD:  Atraumatic, Normocephalic  EYES: EOMI, PERRLA, conjunctiva and sclera clear  NECK: Supple, No JVD  CHEST/LUNG: CTABL, No wheeze  HEART: Regular rate and rhythm; no murmur  ABDOMEN: Soft, Nontender, Nondistended; Bowel sounds present  EXTREMITIES:  2+ Peripheral Pulses, No clubbing, cyanosis, or edema  SKIN: No rashes or lesions  NEURO: No focal deficits  LABS:    03-22    143  |  107  |  32<H>  ----------------------------<  133<H>  3.9   |  21<L>  |  1.43<H>    Ca    9.0      22 Mar 2018 06:16        CAPILLARY BLOOD GLUCOSE                RADIOLOGY & ADDITIONAL TESTS:    Imaging Personally Reviewed:  [x] YES  [ ] NO    Consultant(s) Notes Reviewed:  [x] YES  [ ] NO    MEDICATIONS  (STANDING):  apixaban 5 milliGRAM(s) Oral every 12 hours  atorvastatin 40 milliGRAM(s) Oral at bedtime  cloNIDine 0.1 milliGRAM(s) Oral three times a day  influenza   Vaccine 0.5 milliLiter(s) IntraMuscular once  lisinopril 20 milliGRAM(s) Oral daily  metoprolol     tartrate 75 milliGRAM(s) Oral two times a day    MEDICATIONS  (PRN):  zolpidem 5 milliGRAM(s) Oral at bedtime PRN Insomnia      Care Discussed with Consultants/Other Providers [x] YES  [ ] NO    HEALTH ISSUES - PROBLEM Dx:  Coronary artery disease involving native coronary artery of native heart without angina pectoris: Coronary artery disease involving native coronary artery of native heart without angina pectoris  Atrial fibrillation, unspecified type: Atrial fibrillation, unspecified type  Need for prophylactic measure: Need for prophylactic measure  Adrenal nodule: Adrenal nodule  Foot ulceration: Foot ulceration  Inguinal hernia: Inguinal hernia  CAD (coronary artery disease): CAD (coronary artery disease)  SIRS (systemic inflammatory response syndrome): SIRS (systemic inflammatory response syndrome)  Atrial fibrillation: Atrial fibrillation  Acute encephalopathy: Acute encephalopathy  Hypertensive urgency: Hypertensive urgency

## 2018-03-23 NOTE — PROGRESS NOTE ADULT - SUBJECTIVE AND OBJECTIVE BOX
[facility] Cardiology Progress Note  _______________________________    Pt. seen and examined. No new cardiac-related complaints. No chest pain, dyspnea or palpitations.    Telemetry - Afib 50-80's.    T(C): 36.6 (03-23-18 @ 04:06), Max: 36.8 (03-22-18 @ 09:30)  HR: 68 (03-23-18 @ 04:06) (65 - 83)  BP: 152/87 (03-23-18 @ 04:06) (100/64 - 161/82)  RR: 18 (03-23-18 @ 04:06) (18 - 18)  SpO2: 94% (03-23-18 @ 04:06) (94% - 96%)  I&O's Summary    22 Mar 2018 07:01  -  23 Mar 2018 07:00  --------------------------------------------------------  IN: 1150 mL / OUT: 0 mL / NET: 1150 mL        PHYSICAL EXAM:  GENERAL: Alert, NAD  NECK: Supple, No JVD, No carotid bruit.  CHEST/LUNG: Clear to auscultation bilaterally; No wheezes, rales, or rhonchi  HEART: S1 S2 normal, irreg irreg rhythm  ABDOMEN: Soft, Nontender, Nondistended; Bowel sounds present  EXTREMITIES:  No LE edema.    LABS:    03-22    143  |  107  |  32<H>  ----------------------------<  133<H>  3.9   |  21<L>  |  1.43<H>    Ca    9.0      22 Mar 2018 06:16        CARDIAC MARKERS ( 18 Mar 2018 05:30 )  x     / <0.01 ng/mL / 332 U/L / x     / 6.5 ng/mL  CARDIAC MARKERS ( 17 Mar 2018 12:58 )  x     / <0.01 ng/mL / x     / x     / 2.9 ng/mL          MEDICATIONS  (STANDING):  apixaban 5 milliGRAM(s) Oral every 12 hours  atorvastatin 40 milliGRAM(s) Oral at bedtime  cloNIDine 0.1 milliGRAM(s) Oral three times a day  influenza   Vaccine 0.5 milliLiter(s) IntraMuscular once  lisinopril 20 milliGRAM(s) Oral daily  metoprolol     tartrate 75 milliGRAM(s) Oral two times a day    MEDICATIONS  (PRN):  zolpidem 5 milliGRAM(s) Oral at bedtime PRN Insomnia      RADIOLOGY & ADDITIONAL TESTS:

## 2018-03-23 NOTE — PROGRESS NOTE ADULT - PROBLEM SELECTOR PROBLEM 3
Atrial fibrillation
Coronary artery disease involving native coronary artery of native heart without angina pectoris
Atrial fibrillation

## 2018-03-23 NOTE — PROGRESS NOTE ADULT - PROVIDER SPECIALTY LIST ADULT
Cardiology
Internal Medicine
Podiatry
Surgery
Internal Medicine
Internal Medicine

## 2018-03-28 ENCOUNTER — INPATIENT (INPATIENT)
Facility: HOSPITAL | Age: 73
LOS: 7 days | Discharge: HOME | DRG: 305 | End: 2018-04-05
Attending: HOSPITALIST | Admitting: HOSPITALIST
Payer: MEDICARE

## 2018-03-28 VITALS
OXYGEN SATURATION: 98 % | TEMPERATURE: 99 F | SYSTOLIC BLOOD PRESSURE: 196 MMHG | HEART RATE: 83 BPM | DIASTOLIC BLOOD PRESSURE: 110 MMHG | RESPIRATION RATE: 18 BRPM

## 2018-03-28 DIAGNOSIS — Z98.890 OTHER SPECIFIED POSTPROCEDURAL STATES: Chronic | ICD-10-CM

## 2018-03-28 DIAGNOSIS — E78.00 PURE HYPERCHOLESTEROLEMIA, UNSPECIFIED: ICD-10-CM

## 2018-03-28 DIAGNOSIS — I25.10 ATHEROSCLEROTIC HEART DISEASE OF NATIVE CORONARY ARTERY WITHOUT ANGINA PECTORIS: ICD-10-CM

## 2018-03-28 DIAGNOSIS — Z95.1 PRESENCE OF AORTOCORONARY BYPASS GRAFT: Chronic | ICD-10-CM

## 2018-03-28 DIAGNOSIS — I16.0 HYPERTENSIVE URGENCY: ICD-10-CM

## 2018-03-28 DIAGNOSIS — I10 ESSENTIAL (PRIMARY) HYPERTENSION: ICD-10-CM

## 2018-03-28 DIAGNOSIS — F03.90 UNSPECIFIED DEMENTIA WITHOUT BEHAVIORAL DISTURBANCE: ICD-10-CM

## 2018-03-28 DIAGNOSIS — I48.2 CHRONIC ATRIAL FIBRILLATION: ICD-10-CM

## 2018-03-28 PROBLEM — E78.5 HYPERLIPIDEMIA, UNSPECIFIED: Chronic | Status: ACTIVE | Noted: 2018-03-17

## 2018-03-28 PROBLEM — K40.90 UNILATERAL INGUINAL HERNIA, WITHOUT OBSTRUCTION OR GANGRENE, NOT SPECIFIED AS RECURRENT: Chronic | Status: ACTIVE | Noted: 2018-03-17

## 2018-03-28 PROBLEM — H26.9 UNSPECIFIED CATARACT: Chronic | Status: ACTIVE | Noted: 2018-03-17

## 2018-03-28 LAB
ALBUMIN SERPL ELPH-MCNC: 4.2 G/DL — SIGNIFICANT CHANGE UP (ref 3.3–5)
ALP SERPL-CCNC: 79 U/L — SIGNIFICANT CHANGE UP (ref 40–120)
ALT FLD-CCNC: 17 U/L RC — SIGNIFICANT CHANGE UP (ref 10–45)
ANION GAP SERPL CALC-SCNC: 13 MMOL/L — SIGNIFICANT CHANGE UP (ref 5–17)
AST SERPL-CCNC: 24 U/L — SIGNIFICANT CHANGE UP (ref 10–40)
BASOPHILS # BLD AUTO: 0 K/UL — SIGNIFICANT CHANGE UP (ref 0–0.2)
BASOPHILS NFR BLD AUTO: 0.6 % — SIGNIFICANT CHANGE UP (ref 0–2)
BILIRUB SERPL-MCNC: 0.7 MG/DL — SIGNIFICANT CHANGE UP (ref 0.2–1.2)
BUN SERPL-MCNC: 27 MG/DL — HIGH (ref 7–23)
CALCIUM SERPL-MCNC: 9.5 MG/DL — SIGNIFICANT CHANGE UP (ref 8.4–10.5)
CHLORIDE SERPL-SCNC: 104 MMOL/L — SIGNIFICANT CHANGE UP (ref 96–108)
CO2 SERPL-SCNC: 22 MMOL/L — SIGNIFICANT CHANGE UP (ref 22–31)
CREAT SERPL-MCNC: 1.18 MG/DL — SIGNIFICANT CHANGE UP (ref 0.5–1.3)
EOSINOPHIL # BLD AUTO: 0.1 K/UL — SIGNIFICANT CHANGE UP (ref 0–0.5)
EOSINOPHIL NFR BLD AUTO: 1.2 % — SIGNIFICANT CHANGE UP (ref 0–6)
GLUCOSE SERPL-MCNC: 163 MG/DL — HIGH (ref 70–99)
HCT VFR BLD CALC: 42.9 % — SIGNIFICANT CHANGE UP (ref 39–50)
HGB BLD-MCNC: 15 G/DL — SIGNIFICANT CHANGE UP (ref 13–17)
LYMPHOCYTES # BLD AUTO: 0.8 K/UL — LOW (ref 1–3.3)
LYMPHOCYTES # BLD AUTO: 12 % — LOW (ref 13–44)
MAGNESIUM SERPL-MCNC: 2.1 MG/DL — SIGNIFICANT CHANGE UP (ref 1.6–2.6)
MCHC RBC-ENTMCNC: 32 PG — SIGNIFICANT CHANGE UP (ref 27–34)
MCHC RBC-ENTMCNC: 34.9 GM/DL — SIGNIFICANT CHANGE UP (ref 32–36)
MCV RBC AUTO: 91.7 FL — SIGNIFICANT CHANGE UP (ref 80–100)
MONOCYTES # BLD AUTO: 0.5 K/UL — SIGNIFICANT CHANGE UP (ref 0–0.9)
MONOCYTES NFR BLD AUTO: 7.1 % — SIGNIFICANT CHANGE UP (ref 2–14)
NEUTROPHILS # BLD AUTO: 5.4 K/UL — SIGNIFICANT CHANGE UP (ref 1.8–7.4)
NEUTROPHILS NFR BLD AUTO: 79.1 % — HIGH (ref 43–77)
PHOSPHATE SERPL-MCNC: 3.3 MG/DL — SIGNIFICANT CHANGE UP (ref 2.5–4.5)
PLATELET # BLD AUTO: 209 K/UL — SIGNIFICANT CHANGE UP (ref 150–400)
POTASSIUM SERPL-MCNC: 4.5 MMOL/L — SIGNIFICANT CHANGE UP (ref 3.5–5.3)
POTASSIUM SERPL-SCNC: 4.5 MMOL/L — SIGNIFICANT CHANGE UP (ref 3.5–5.3)
PROT SERPL-MCNC: 8.2 G/DL — SIGNIFICANT CHANGE UP (ref 6–8.3)
RBC # BLD: 4.68 M/UL — SIGNIFICANT CHANGE UP (ref 4.2–5.8)
RBC # FLD: 13.2 % — SIGNIFICANT CHANGE UP (ref 10.3–14.5)
RENIN PLAS-CCNC: 0.22 NG/ML/HR — SIGNIFICANT CHANGE UP (ref 0.17–5.38)
SODIUM SERPL-SCNC: 139 MMOL/L — SIGNIFICANT CHANGE UP (ref 135–145)
WBC # BLD: 6.9 K/UL — SIGNIFICANT CHANGE UP (ref 3.8–10.5)
WBC # FLD AUTO: 6.9 K/UL — SIGNIFICANT CHANGE UP (ref 3.8–10.5)

## 2018-03-28 PROCEDURE — 99222 1ST HOSP IP/OBS MODERATE 55: CPT | Mod: GC

## 2018-03-28 PROCEDURE — 71045 X-RAY EXAM CHEST 1 VIEW: CPT | Mod: 26

## 2018-03-28 PROCEDURE — 99285 EMERGENCY DEPT VISIT HI MDM: CPT | Mod: 25

## 2018-03-28 PROCEDURE — 93010 ELECTROCARDIOGRAM REPORT: CPT

## 2018-03-28 RX ORDER — LISINOPRIL 2.5 MG/1
20 TABLET ORAL DAILY
Qty: 0 | Refills: 0 | Status: DISCONTINUED | OUTPATIENT
Start: 2018-03-28 | End: 2018-04-05

## 2018-03-28 RX ORDER — INFLUENZA VIRUS VACCINE 15; 15; 15; 15 UG/.5ML; UG/.5ML; UG/.5ML; UG/.5ML
0.5 SUSPENSION INTRAMUSCULAR ONCE
Qty: 0 | Refills: 0 | Status: DISCONTINUED | OUTPATIENT
Start: 2018-03-28 | End: 2018-04-05

## 2018-03-28 RX ORDER — ATORVASTATIN CALCIUM 80 MG/1
40 TABLET, FILM COATED ORAL AT BEDTIME
Qty: 0 | Refills: 0 | Status: DISCONTINUED | OUTPATIENT
Start: 2018-03-28 | End: 2018-04-05

## 2018-03-28 RX ORDER — METOPROLOL TARTRATE 50 MG
75 TABLET ORAL
Qty: 0 | Refills: 0 | Status: DISCONTINUED | OUTPATIENT
Start: 2018-03-28 | End: 2018-04-05

## 2018-03-28 RX ORDER — APIXABAN 2.5 MG/1
5 TABLET, FILM COATED ORAL EVERY 12 HOURS
Qty: 0 | Refills: 0 | Status: DISCONTINUED | OUTPATIENT
Start: 2018-03-28 | End: 2018-04-05

## 2018-03-28 RX ADMIN — ATORVASTATIN CALCIUM 40 MILLIGRAM(S): 80 TABLET, FILM COATED ORAL at 22:08

## 2018-03-28 RX ADMIN — Medication 75 MILLIGRAM(S): at 17:59

## 2018-03-28 RX ADMIN — Medication 0.1 MILLIGRAM(S): at 12:40

## 2018-03-28 RX ADMIN — APIXABAN 5 MILLIGRAM(S): 2.5 TABLET, FILM COATED ORAL at 22:08

## 2018-03-28 RX ADMIN — Medication 0.1 MILLIGRAM(S): at 22:08

## 2018-03-28 NOTE — H&P ADULT - ASSESSMENT
72 yr old male w/ a hx of dementia, CAD s/p CABG, essential HTN, HLD, and inguinal hernia presents to the ED w/ HTN, inability to care for himself.  Patient is labeled with a h/o dementia, however he is A and O x3, and has insight into his "cognitive" deficits.  May want to consider alternative diagnosis such as depression with psuedodementia.

## 2018-03-28 NOTE — H&P ADULT - NSHPLABSRESULTS_GEN_ALL_CORE
LABS:                        15.0   6.9   )-----------( 209      ( 28 Mar 2018 11:58 )             42.9     03-28    139  |  104  |  27<H>  ----------------------------<  163<H>  4.5   |  22  |  1.18    Ca    9.5      28 Mar 2018 11:58  Phos  3.3     03-28  Mg     2.1     03-28    TPro  8.2  /  Alb  4.2  /  TBili  0.7  /  DBili  x   /  AST  24  /  ALT  17  /  AlkPhos  79  03-28              RADIOLOGY & ADDITIONAL TESTS:    Imaging Personally Reviewed:

## 2018-03-28 NOTE — ED PROVIDER NOTE - PSH
H/O inguinal hernia repair  R sided  History of appendectomy    S/P CABG (coronary artery bypass graft)

## 2018-03-28 NOTE — H&P ADULT - PROBLEM SELECTOR PLAN 5
was not taking ac at home due to cost, will put on apixaban here, consider coumadin for cost reasons if compliance and follow can be assured.

## 2018-03-28 NOTE — ED PROVIDER NOTE - MEDICAL DECISION MAKING DETAILS
Pt is a 73 y/o male w/ a hx of HTN, HLD, AFIb and Dementia that is currently taking Eliquis. Pt has uncontrolled htn d/t incompliant w/ medication and concern for dementia   Basic labs ekg, CXR, and consult w/ social work and may need home health assistance at home

## 2018-03-28 NOTE — H&P ADULT - NSHPPHYSICALEXAM_GEN_ALL_CORE
Vital Signs Last 24 Hrs  T(C): 36.4 (28 Mar 2018 15:13), Max: 37.1 (28 Mar 2018 12:28)  T(F): 97.6 (28 Mar 2018 15:13), Max: 98.8 (28 Mar 2018 12:28)  HR: 75 (28 Mar 2018 15:13) (68 - 83)  BP: 119/73 (28 Mar 2018 15:13) (119/73 - 196/110)  BP(mean): --  RR: 18 (28 Mar 2018 15:13) (17 - 18)  SpO2: 93% (28 Mar 2018 15:13) (93% - 100%)  CAPILLARY BLOOD GLUCOSE        I&O's Summary      PHYSICAL EXAM:  GENERAL: NAD, dishelved appearing, poor hygiene  HEAD:  Atraumatic, Normocephalic  EYES: EOMI, PERRLA, conjunctiva and sclera clear  NECK: Supple, No JVD  CHEST/LUNG: Clear to auscultation bilaterally; No wheeze  HEART: Regular rate and rhythm; No murmurs, rubs, or gallops  ABDOMEN: Soft, Nontender, Nondistended; Bowel sounds present  EXTREMITIES:  2+ Peripheral Pulses, No clubbing, cyanosis, or edema  PSYCH: AAOx3  NEUROLOGY: non-focal  SKIN: No rashes or lesions

## 2018-03-28 NOTE — ED PROVIDER NOTE - OBJECTIVE STATEMENT
Pt is a 71 y/o male w/ a hx of Dementia, CAD s/p CABG, essential HTN, HLD, and Inguinal hernia presents to the ED w/ HTN.  Pt has developing dementia and is not compliant taking the medications by himself. Family feels that pt cannot take care of himself financially ,physically, and mentally. Assisted nursing refused to come back d/t him possibly being combative w/ him. Family cannot take care of patient and wants assistance.   Denies any fever, chills, or N/V. Experiences no palpitation, chest pain, or SOB. No constipation or diarrhea. Pt has had no recent travel and no sick contacts.

## 2018-03-28 NOTE — ED ADULT NURSE NOTE - CHPI ED SYMPTOMS NEG
no chills/no vomiting/no nausea/no numbness/no dizziness/no fever/no tingling/no pain/no decreased eating/drinking/no weakness

## 2018-03-28 NOTE — H&P ADULT - PROBLEM SELECTOR PLAN 4
social work eval to assist with long term placement.  Consider alternative diagnosis such as depression.

## 2018-03-28 NOTE — ED ADULT NURSE NOTE - OBJECTIVE STATEMENT
72 yr old male with h/o dementia and HTN present to the ER because he is unable to care for himself. As per family members pt has being non compliant with his meds due to his declining neurological status. Pt blood pressure has being uncontrollable, however pt has no complain of headache/ dizziness, nausea/ vomiting.

## 2018-03-28 NOTE — ED PROVIDER NOTE - PMH
CAD (coronary artery disease)  s/p CABG  Cataract  L eye  Essential hypertension    Hyperlipemia    Inguinal hernia

## 2018-03-28 NOTE — H&P ADULT - HISTORY OF PRESENT ILLNESS
72 yr old male w/ a hx of dementia, CAD s/p CABG, essential HTN, HLD, and inguinal hernia presents to the ED w/ HTN.  Pt has worsening dementia and is not compliant taking medications by himself, and admits that he also difficulty performing ADLs. Family feels that pt cannot take care of himself financially ,physically, and mentally. Visiting nursing services refused to come back due to the patient being combative. Family cannot take care of patient and wants assistance.   Denies any fever, chills, or N/V. Experiences no palpitation, chest pain, or SOB. No constipation or diarrhea. Pt was discharged from the hospital last week after being admitted for HTN urgency, found to have new onset afib at the time.  Patient was started on anticoagulation but states it was too expensive and was only taking aspirin at home.

## 2018-03-29 DIAGNOSIS — K40.90 UNILATERAL INGUINAL HERNIA, WITHOUT OBSTRUCTION OR GANGRENE, NOT SPECIFIED AS RECURRENT: ICD-10-CM

## 2018-03-29 PROBLEM — I10 ESSENTIAL (PRIMARY) HYPERTENSION: Chronic | Status: INACTIVE | Noted: 2018-03-17 | Resolved: 2018-03-28

## 2018-03-29 LAB
ANION GAP SERPL CALC-SCNC: 13 MMOL/L — SIGNIFICANT CHANGE UP (ref 5–17)
BUN SERPL-MCNC: 38 MG/DL — HIGH (ref 7–23)
CALCIUM SERPL-MCNC: 9.5 MG/DL — SIGNIFICANT CHANGE UP (ref 8.4–10.5)
CHLORIDE SERPL-SCNC: 104 MMOL/L — SIGNIFICANT CHANGE UP (ref 96–108)
CO2 SERPL-SCNC: 24 MMOL/L — SIGNIFICANT CHANGE UP (ref 22–31)
CREAT SERPL-MCNC: 1.53 MG/DL — HIGH (ref 0.5–1.3)
GLUCOSE SERPL-MCNC: 138 MG/DL — HIGH (ref 70–99)
HCT VFR BLD CALC: 41.1 % — SIGNIFICANT CHANGE UP (ref 39–50)
HGB BLD-MCNC: 14.3 G/DL — SIGNIFICANT CHANGE UP (ref 13–17)
MCHC RBC-ENTMCNC: 32 PG — SIGNIFICANT CHANGE UP (ref 27–34)
MCHC RBC-ENTMCNC: 34.8 GM/DL — SIGNIFICANT CHANGE UP (ref 32–36)
MCV RBC AUTO: 91.9 FL — SIGNIFICANT CHANGE UP (ref 80–100)
NRBC # BLD: 0 /100 WBCS — SIGNIFICANT CHANGE UP (ref 0–0)
PLATELET # BLD AUTO: 196 K/UL — SIGNIFICANT CHANGE UP (ref 150–400)
POTASSIUM SERPL-MCNC: 4.1 MMOL/L — SIGNIFICANT CHANGE UP (ref 3.5–5.3)
POTASSIUM SERPL-SCNC: 4.1 MMOL/L — SIGNIFICANT CHANGE UP (ref 3.5–5.3)
RBC # BLD: 4.47 M/UL — SIGNIFICANT CHANGE UP (ref 4.2–5.8)
RBC # FLD: 14.3 % — SIGNIFICANT CHANGE UP (ref 10.3–14.5)
SODIUM SERPL-SCNC: 141 MMOL/L — SIGNIFICANT CHANGE UP (ref 135–145)
TSH SERPL-MCNC: 1.25 UIU/ML — SIGNIFICANT CHANGE UP (ref 0.27–4.2)
VIT B12 SERPL-MCNC: 791 PG/ML — SIGNIFICANT CHANGE UP (ref 232–1245)
WBC # BLD: 7.36 K/UL — SIGNIFICANT CHANGE UP (ref 3.8–10.5)
WBC # FLD AUTO: 7.36 K/UL — SIGNIFICANT CHANGE UP (ref 3.8–10.5)

## 2018-03-29 PROCEDURE — 99251: CPT

## 2018-03-29 RX ORDER — SODIUM CHLORIDE 9 MG/ML
1000 INJECTION INTRAMUSCULAR; INTRAVENOUS; SUBCUTANEOUS
Qty: 0 | Refills: 0 | Status: DISCONTINUED | OUTPATIENT
Start: 2018-03-29 | End: 2018-04-01

## 2018-03-29 RX ADMIN — Medication 75 MILLIGRAM(S): at 17:46

## 2018-03-29 RX ADMIN — Medication 0.1 MILLIGRAM(S): at 05:44

## 2018-03-29 RX ADMIN — LISINOPRIL 20 MILLIGRAM(S): 2.5 TABLET ORAL at 05:42

## 2018-03-29 RX ADMIN — SODIUM CHLORIDE 60 MILLILITER(S): 9 INJECTION INTRAMUSCULAR; INTRAVENOUS; SUBCUTANEOUS at 17:47

## 2018-03-29 RX ADMIN — APIXABAN 5 MILLIGRAM(S): 2.5 TABLET, FILM COATED ORAL at 06:00

## 2018-03-29 RX ADMIN — Medication 0.1 MILLIGRAM(S): at 21:02

## 2018-03-29 RX ADMIN — Medication 75 MILLIGRAM(S): at 05:41

## 2018-03-29 RX ADMIN — Medication 0.1 MILLIGRAM(S): at 13:23

## 2018-03-29 RX ADMIN — ATORVASTATIN CALCIUM 40 MILLIGRAM(S): 80 TABLET, FILM COATED ORAL at 21:02

## 2018-03-29 RX ADMIN — APIXABAN 5 MILLIGRAM(S): 2.5 TABLET, FILM COATED ORAL at 17:47

## 2018-03-29 NOTE — DISCHARGE NOTE ADULT - CARE PLAN
Principal Discharge DX:	Uncontrolled hypertension  Assessment and plan of treatment:	Low salt diet.  Activity as tolerated.  Take all medication as prescribed.  Follow up with your medical doctor for routine blood pressure monitoring at your next visit.  Notify your doctor if you have any of the following symptoms:   Dizziness, Lightheadedness, Blurry vision, Headache, Chest pain, Shortness of breath  Secondary Diagnosis:	Dementia without behavioral disturbance, unspecified dementia type  Secondary Diagnosis:	Inguinal hernia  Assessment and plan of treatment:	follow up with [facility], call [phone] for an appointment.  Secondary Diagnosis:	Chronic atrial fibrillation  Assessment and plan of treatment:	Continue with Eliquis Principal Discharge DX:	Uncontrolled hypertension  Goal:	blood pressure is controlled  Assessment and plan of treatment:	Low salt diet.  Activity as tolerated.  Take all medication as prescribed.  Follow up with your medical doctor for routine blood pressure monitoring at your next visit.  Notify your doctor if you have any of the following symptoms:   Dizziness, Lightheadedness, Blurry vision, Headache, Chest pain, Shortness of breath  Secondary Diagnosis:	Dementia without behavioral disturbance, unspecified dementia type  Assessment and plan of treatment:	Follow up with your Primary care doctor   SEEK MEDICAL CARE IF:  New behavioral problems start such as moodiness, aggressiveness, or seeing things that are not there (hallucinations).  Any new problem with brain function happens. This includes problems with balance, speech, or falling a lot.  Problems with swallowing develop.  Any symptoms of other illness happen.  Small changes or worsening in any aspect of brain function can be a sign that the illness is getting worse. It can also be a sign of another medical illness such as infection. Seeing a caregiver right away is important.  SEEK IMMEDIATE MEDICAL CARE IF:  A fever develops.  New or worsened confusion develops.  New or worsened sleepiness develops.  Staying awake becomes hard to do.  Secondary Diagnosis:	Inguinal hernia  Assessment and plan of treatment:	follow up with Surgery Clinic, call [phone] for an appointment.  Secondary Diagnosis:	Chronic atrial fibrillation  Assessment and plan of treatment:	Continue with Eliquis

## 2018-03-29 NOTE — CONSULT NOTE ADULT - ASSESSMENT
Patient is a 73 yo male with a large chronic L inguinal hernia with no obstructive symptoms or strangulation.  Patient seen and examined with Dr. DAVION Quispe. Due to the chronicity of the hernia, at this time, it is advised that patient follow as an outpatient.  Patient can follow up with Surgery Clinic, call [phone] for an appointment.    ABDOUL Singh MD PGYII

## 2018-03-29 NOTE — DISCHARGE NOTE ADULT - SECONDARY DIAGNOSIS.
Dementia without behavioral disturbance, unspecified dementia type Inguinal hernia Chronic atrial fibrillation

## 2018-03-29 NOTE — DISCHARGE NOTE ADULT - PATIENT PORTAL LINK FT
You can access the [facility] Patient Portal, offered by [facility], by registering with the following website: [email or web address]

## 2018-03-29 NOTE — CONSULT NOTE ADULT - SUBJECTIVE AND OBJECTIVE BOX
GENERAL SURGERY CONSULT NOTE   Attending Consulted: Dr. DAVION Quispe (ATP Service)    HPI: This patient is a 72y old Male who was admitted for HTN and new onset dementia causing difficulty with ADLS. Surgery consult called for a Large L inguinal hernia that patient reports has been present for 2-3 decades.  He is passing gas and having bowel movements with no change in po intake.  As per previous Surgery consults, patient was advised to follow up as an outpatient for correction of this hernia.  There has been no change in the color of the skin over the hernia and patient has never attempted to reduce it.     Patient states that he has yet to fix his hernia due to lack of part B insurance which he will be obtaining in the next month.    PMHx:   HTN  Dementia (new onset)  CAD s/p CABG x2  PAD   Afib on Eliquis      PSHx:   appendectomy at 16 years old  R inguinal hernia repair, "many years ago"      Medications (home): Medications (inpatient): apixaban 5 milliGRAM(s) Oral every 12 hours  atorvastatin 40 milliGRAM(s) Oral at bedtime  cloNIDine 0.1 milliGRAM(s) Oral three times a day  influenza   Vaccine 0.5 milliLiter(s) IntraMuscular once  lisinopril 20 milliGRAM(s) Oral daily  metoprolol tartrate 75 milliGRAM(s) Oral two times a day    Medications (PRN):  Allergies    No Known Allergies    Intolerances      Social Hx:   retired, 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    Physical Exam  T(C): 36.4 (03-29-18 @ 04:35), Max: 37.1 (03-28-18 @ 12:28)  HR: 60 (03-29-18 @ 04:35) (60 - 83)  BP: 158/84 (03-29-18 @ 04:35) (119/73 - 196/110)  RR: 18 (03-29-18 @ 04:35) (17 - 18)  SpO2: 92% (03-29-18 @ 04:35) (92% - 100%)  Tmax: T(C): , Max: 37.1 (03-28-18 @ 12:28)      03-28-18  -  03-29-18  --------------------------------------------------------  IN:    Oral Fluid: 720 mL  Total IN: 720 mL    OUT:  Total OUT: 0 mL    Total NET: 720 mL        General: disheveled, well nourished, NAD  Neuro: alert and oriented x3, no focal deficits, moves all extremities spontaneously  HEENT: NCAT, EOMI, anicteric, mucosa moist  Respiratory: airway patent, respirations unlabored  CVS: a fib, rate controlled  Abdomen: soft, nontender, nondistended, scrotum L large inguinal hernia, mild erythema over distal scrotal skin  Extremities: no edema, sensation and movement grossly intact  Skin: warm, dry, appropriate color    Labs:                        14.3   7.36  )-----------( 196      ( 29 Mar 2018 08:05 )             41.1       03-29    141  |  104  |  38<H>  ----------------------------<  138<H>  4.1   |  24  |  1.53<H>    Ca    9.5      29 Mar 2018 06:52  Phos  3.3     03-28  Mg     2.1     03-28    TPro  8.2  /  Alb  4.2  /  TBili  0.7  /  DBili  x   /  AST  24  /  ALT  17  /  AlkPhos  79  03-28            Imaging and other studies:  < from: CT Abdomen and Pelvis w/ Oral Cont and w/ IV Cont (03.17.18 @ 17:06) >  INTERPRETATION:  Clinical information: Evaluate for hernia.    CT scan of the abdomen is obtained following administration of both oral   and intravenous contrast. Approximately 90 cc of Omnipaque 350 was   administered and 10 cc was discarded.    Heart is enlarged in size. Calcification the coronary arteries is noted.     Liver, spleen, and pancreas are unremarkable. Stone is noted within the   gallbladder. Small nodules are noted within both adrenal glands which are   indeterminate based on this exam.     Both kidneys enhance with contrast. No hydronephrosis is noted. Small   low-attenuation lesion in the left kidney is too small to be adequately   characterized on this exam.    No retroperitoneal adenopathy is noted. Infrarenal abdominal aortic   aneurysm measuring 3.7 cm is noted.     Bowel loops are normal in caliber. A very large left inguinal hernia   containing both small and large bowel is noted. It is incompletely imaged   on this exam. Stool is noted within the large bowel.    Examination of the pelvis demonstrates an enlarged prostate gland.     Visualized osseous structures are within normal limits.    Impression: Very large left inguinal hernia containing both small and   large bowel loops. The hernia is incompletely imaged on this exam.    < end of copied text >

## 2018-03-29 NOTE — PROGRESS NOTE ADULT - PROBLEM SELECTOR PLAN 4
social work eval to assist with long term placement.  Consider alternative diagnosis such as depression.  Denied SI/HI.  denied hallucinations.

## 2018-03-29 NOTE — CONSULT NOTE ADULT - ATTENDING COMMENTS
Longstanding left scrotal hernia without signs of obstruction or gangrene for which he has not previously desired hernia repair.  He must visit our outpatient office or surgery clinic if he desires elective left inguinal hernia repair.  Please reconsult PRN

## 2018-03-29 NOTE — DISCHARGE NOTE ADULT - MEDICATION SUMMARY - MEDICATIONS TO STOP TAKING
I will STOP taking the medications listed below when I get home from the hospital:    Xarelto 15 mg oral tablet  -- 4 tab(s) by mouth once a day (in the evening)   -- Check with your doctor before becoming pregnant.  It is very important that you take or use this exactly as directed.  Do not skip doses or discontinue unless directed by your doctor.  Obtain medical advice before taking any non-prescription drugs as some may affect the action of this medication.  Take with food.

## 2018-03-29 NOTE — DISCHARGE NOTE ADULT - PLAN OF CARE
Low salt diet.  Activity as tolerated.  Take all medication as prescribed.  Follow up with your medical doctor for routine blood pressure monitoring at your next visit.  Notify your doctor if you have any of the following symptoms:   Dizziness, Lightheadedness, Blurry vision, Headache, Chest pain, Shortness of breath follow up with Surgery Clinic, call [phone] for an appointment. Continue with Eliquis blood pressure is controlled Follow up with your Primary care doctor   SEEK MEDICAL CARE IF:  New behavioral problems start such as moodiness, aggressiveness, or seeing things that are not there (hallucinations).  Any new problem with brain function happens. This includes problems with balance, speech, or falling a lot.  Problems with swallowing develop.  Any symptoms of other illness happen.  Small changes or worsening in any aspect of brain function can be a sign that the illness is getting worse. It can also be a sign of another medical illness such as infection. Seeing a caregiver right away is important.  SEEK IMMEDIATE MEDICAL CARE IF:  A fever develops.  New or worsened confusion develops.  New or worsened sleepiness develops.  Staying awake becomes hard to do.

## 2018-03-29 NOTE — PROGRESS NOTE ADULT - PROBLEM SELECTOR PLAN 6
Longstanding left scrotal hernia   no signs of strangulation.  does not desire surgery at the moment.  surgery eval appreciated.   to f/u with outpt surgical clinic.

## 2018-03-29 NOTE — DISCHARGE NOTE ADULT - CARE PROVIDER_API CALL
Papito Quispe), Surgery; Surgical Critical Care  [address] [id number]  Phone: [phone]  Fax: [phone]

## 2018-03-29 NOTE — PROVIDER CONTACT NOTE (OTHER) - ASSESSMENT
pt VSS, pt a and o x3, pt denies pain/ discomfort, pt OOB with assist. pt denies n/v/d, denies SOB, denies headache

## 2018-03-29 NOTE — DISCHARGE NOTE ADULT - MEDICATION SUMMARY - MEDICATIONS TO TAKE
I will START or STAY ON the medications listed below when I get home from the hospital:    lisinopril 20 mg oral tablet  -- 1 tab(s) by mouth once a day  -- Indication: For Hypertension    cloNIDine 0.1 mg oral tablet  -- 1 tab(s) by mouth 3 times a day  -- Indication: For Hypertension    apixaban 5 mg oral tablet  -- 1 tab(s) by mouth every 12 hours  -- Indication: For Coronary artery disease involving native coronary artery of native heart without angina pectoris    atorvastatin 40 mg oral tablet  -- 1 tab(s) by mouth once a day (at bedtime)  -- Indication: For Hyperlipidemia    haloperidol 1 mg oral tablet  -- 1 tab(s) by mouth every 6 hours, As needed, agitation  -- Indication: For PRN agitation    metoprolol tartrate 75 mg oral tablet  -- 1 tab(s) by mouth 2 times a day  -- Indication: For Hypertension    nystatin 100,000 units/g topical powder  -- 1 application on skin 2 times a day  -- Indication: For Dermatitis    docusate sodium 100 mg oral capsule  -- 1 cap(s) by mouth 3 times a day  -- Indication: For Constipation    senna oral tablet  -- 2 tab(s) by mouth once a day (at bedtime)  -- Indication: For Constipation

## 2018-03-29 NOTE — DISCHARGE NOTE ADULT - HOSPITAL COURSE
72 yr old male w/ a hx of dementia, CAD s/p CABG, essential HTN, HLD, and inguinal hernia presents to the ED w/ HTN.  Pt has worsening dementia and is not compliant taking medications by himself, and admits that he also difficulty performing ADLs. Family feels that pt cannot take care of himself financially ,physically, and mentally. Surgery consulted for hernia, recommended outpatient follow up an no surgical intervention at this time. Psychiatry was consulted.  Family wants LTC. Pt discharge to LTC with outpatient follow up with PMD and surgery.

## 2018-03-29 NOTE — PROVIDER CONTACT NOTE (OTHER) - BACKGROUND
pt admitted with hypertensive urgency. PMH of Cataract, CAD, Inguinal hernia, Hyperlipidemia, essential hypertension

## 2018-03-30 DIAGNOSIS — F60.9 PERSONALITY DISORDER, UNSPECIFIED: ICD-10-CM

## 2018-03-30 DIAGNOSIS — R41.9 UNSPECIFIED SYMPTOMS AND SIGNS INVOLVING COGNITIVE FUNCTIONS AND AWARENESS: ICD-10-CM

## 2018-03-30 LAB
ANION GAP SERPL CALC-SCNC: 15 MMOL/L — SIGNIFICANT CHANGE UP (ref 5–17)
BUN SERPL-MCNC: 34 MG/DL — HIGH (ref 7–23)
CALCIUM SERPL-MCNC: 9.3 MG/DL — SIGNIFICANT CHANGE UP (ref 8.4–10.5)
CHLORIDE SERPL-SCNC: 107 MMOL/L — SIGNIFICANT CHANGE UP (ref 96–108)
CO2 SERPL-SCNC: 18 MMOL/L — LOW (ref 22–31)
CREAT SERPL-MCNC: 1.39 MG/DL — HIGH (ref 0.5–1.3)
GLUCOSE SERPL-MCNC: 123 MG/DL — HIGH (ref 70–99)
POTASSIUM SERPL-MCNC: 4.2 MMOL/L — SIGNIFICANT CHANGE UP (ref 3.5–5.3)
POTASSIUM SERPL-SCNC: 4.2 MMOL/L — SIGNIFICANT CHANGE UP (ref 3.5–5.3)
SODIUM SERPL-SCNC: 140 MMOL/L — SIGNIFICANT CHANGE UP (ref 135–145)

## 2018-03-30 PROCEDURE — 99222 1ST HOSP IP/OBS MODERATE 55: CPT

## 2018-03-30 RX ORDER — HALOPERIDOL DECANOATE 100 MG/ML
1 INJECTION INTRAMUSCULAR EVERY 6 HOURS
Qty: 0 | Refills: 0 | Status: DISCONTINUED | OUTPATIENT
Start: 2018-03-30 | End: 2018-04-05

## 2018-03-30 RX ORDER — NYSTATIN CREAM 100000 [USP'U]/G
1 CREAM TOPICAL
Qty: 0 | Refills: 0 | Status: DISCONTINUED | OUTPATIENT
Start: 2018-03-30 | End: 2018-04-05

## 2018-03-30 RX ORDER — SENNA PLUS 8.6 MG/1
2 TABLET ORAL AT BEDTIME
Qty: 0 | Refills: 0 | Status: DISCONTINUED | OUTPATIENT
Start: 2018-03-30 | End: 2018-04-05

## 2018-03-30 RX ORDER — DOCUSATE SODIUM 100 MG
100 CAPSULE ORAL THREE TIMES A DAY
Qty: 0 | Refills: 0 | Status: DISCONTINUED | OUTPATIENT
Start: 2018-03-30 | End: 2018-04-05

## 2018-03-30 RX ADMIN — SODIUM CHLORIDE 60 MILLILITER(S): 9 INJECTION INTRAMUSCULAR; INTRAVENOUS; SUBCUTANEOUS at 05:17

## 2018-03-30 RX ADMIN — LISINOPRIL 20 MILLIGRAM(S): 2.5 TABLET ORAL at 05:16

## 2018-03-30 RX ADMIN — APIXABAN 5 MILLIGRAM(S): 2.5 TABLET, FILM COATED ORAL at 17:06

## 2018-03-30 RX ADMIN — ATORVASTATIN CALCIUM 40 MILLIGRAM(S): 80 TABLET, FILM COATED ORAL at 21:28

## 2018-03-30 RX ADMIN — Medication 0.1 MILLIGRAM(S): at 21:28

## 2018-03-30 RX ADMIN — APIXABAN 5 MILLIGRAM(S): 2.5 TABLET, FILM COATED ORAL at 05:17

## 2018-03-30 RX ADMIN — Medication 0.1 MILLIGRAM(S): at 13:00

## 2018-03-30 RX ADMIN — Medication 0.1 MILLIGRAM(S): at 05:17

## 2018-03-30 RX ADMIN — Medication 75 MILLIGRAM(S): at 17:07

## 2018-03-30 RX ADMIN — SENNA PLUS 2 TABLET(S): 8.6 TABLET ORAL at 21:28

## 2018-03-30 RX ADMIN — Medication 100 MILLIGRAM(S): at 13:12

## 2018-03-30 RX ADMIN — NYSTATIN CREAM 1 APPLICATION(S): 100000 CREAM TOPICAL at 12:11

## 2018-03-30 RX ADMIN — Medication 100 MILLIGRAM(S): at 21:28

## 2018-03-30 RX ADMIN — Medication 75 MILLIGRAM(S): at 05:17

## 2018-03-30 NOTE — BEHAVIORAL HEALTH ASSESSMENT NOTE - NSBHCHARTREVIEWINVESTIGATE_PSY_A_CORE FT
Ventricular Rate 68 BPM    Atrial Rate 80 BPM    QRS Duration 92 ms     ms    QTc 442 ms    R Axis -6 degrees    T Axis -7 degrees    Diagnosis Line ATRIAL FIBRILLATION  MINIMAL VOLTAGE CRITERIA FOR LVH, MAY BE NORMAL VARIANT  ABNORMAL ECG

## 2018-03-30 NOTE — BEHAVIORAL HEALTH ASSESSMENT NOTE - HPI (INCLUDE ILLNESS QUALITY, SEVERITY, DURATION, TIMING, CONTEXT, MODIFYING FACTORS, ASSOCIATED SIGNS AND SYMPTOMS)
Patient is a 72 yr old single male, domiciled alone, former  and , history of anxiety, followed by a psychiatrist for about 10 years many years ago, no prior inpatient psych admissions, medical h/o CAD s/p CABG, essential HTN, HLD, labeled as dementia, presents to the ED w/ HTN, psych eval called to assess to rule out pseudomentia due to depression. On interview, patient is calm and cooperative. He states his mood is "anxious" because he is being evaluated by a psychiatrist. He denies depressed mood. He states that he has been staying up very late and sleeping during the day. He denies changes in appetite. Patient states that he spends most of the time at home and enjoys reading non-fiction and watching the news. He prefers to be alone and states he has always been this way. Patient states he began seeing a psychiatrist in college for anxiety. After college, patient was enrolled in PhD program in  History, but could not complete it. After that time, patient worked as an , and cared for his aging mother. He then worked as a , but quit 1.5 years ago. He has required financial assistance from family for a while. Patient denies current or past psychotic symptoms. He denies prior episodes of feliciano. Patient denies SI/HI. He states that he has had passive suicidal ideation  thinking, "If I have alzheimer's disease I should kill myself." Patient is concerned about issues with his memory. He states that when he returned to his home last week following a previous hospitalization, he was disoriented and did not know where various things were in his house. He also had difficulty feeding himself. Patient states that over time these things came back to him, but he has not been showering himself. He states that he became angry at a visiting nurse when he believed the nurse was ignoring him. He states that he yelled at the nurse and that she then left his apartment and did not return.   As per medical note, his family is concerned about worsening dementia. Family feels that pt cannot take care of himself financially ,physically, and mentally. Visiting nursing services refused to come back due to the patient being combative. Family cannot take care of patient and wants assistance.

## 2018-03-30 NOTE — BEHAVIORAL HEALTH ASSESSMENT NOTE - SUMMARY
Patient is a 72 yr old single male, domiciled alone, former  and , history of anxiety, followed by a psychiatrist for about 10 years many years ago, no prior inpatient psych admissions, medical h/o CAD s/p CABG, essential HTN, HLD, labeled as dementia, presents to the ED w/ HTN, psych eval called to assess to rule out pseudomentia due to depression. Patient does not currently meed criteria for MDD. There is no evidence for psychosis. Behavioral control has been good on the floor. MoCA administered 20/30.

## 2018-03-30 NOTE — BEHAVIORAL HEALTH ASSESSMENT NOTE - NSBHREFERDETAILS_PSY_A_CORE_FT
agitation at home- family states that patient was diagnosed with schizophrenia and was seen by a psychiatrist in the past- medically admitted for uncontrolled hypertension

## 2018-03-30 NOTE — BEHAVIORAL HEALTH ASSESSMENT NOTE - CASE SUMMARY
Patient is a 72 yr old single male, domiciled alone, former  and , history of anxiety, followed by a psychiatrist for about 10 years many years ago, no prior inpatient psych admissions, medical h/o CAD s/p CABG, essential HTN, HLD, labeled as dementia, presents to the ED w/ HTN, psych eval called to assess to rule out pseudomentia due to depression. Pt denies mood symptoms, no si/hi, no psychosis, feliciano. Pt states he yelled at his home aide, didn't recall what exactly he said, denies physical aggression. no prns needed here in hospital. rec haldol 1mg po/iv [id number] agitation, no 1;1 needed.

## 2018-03-30 NOTE — BEHAVIORAL HEALTH ASSESSMENT NOTE - RISK ASSESSMENT
Risk factors: Single, male, anxiety, academic/occupational decline, absence of outpatient follow-up.    Protective factors: Denies any active suicidal ideation/intent/plan, no hx of prior attempts, no self-harm behaviors, no hospitalizations, no family hx, has no acute affective or psychotic disorder/symptoms, has responsibility to family and others, fear of death/dying due to pain/suffering, supportive social network or family, no active substance use, no access to firearms, no legal issues, no hx of abuse.    Based on risk assessment evaluation, patient does not appear to be at imminent risk of harm to self or others at this time.

## 2018-03-30 NOTE — BEHAVIORAL HEALTH ASSESSMENT NOTE - NSBHSUICPROTECTFACT_PSY_A_CORE
Identifies reasons for living/Supportive social network or family/Fear of death or dying due to pain/suffering

## 2018-03-30 NOTE — PROGRESS NOTE ADULT - ATTENDING COMMENTS
social work eval.  physical therapy.   D/c planning.     - Dr. THOR Ruiz ([facility])  - [phone]

## 2018-03-30 NOTE — BEHAVIORAL HEALTH ASSESSMENT NOTE - NSBHCHARTREVIEWIMAGING_PSY_A_CORE FT
EXAM:  XR CHEST AP OR PA 1V                            PROCEDURE DATE:  03/28/2018      INTERPRETATION:    CLINICAL INDICATION: Back pain and altered mental status.    TECHNIQUE: Frontal view of the chest.    COMPARISON: Chest radiograph 3/17/2018    FINDINGS:     Cardiac size is within normal limits.   Lungs are clear.   Status post sternotomy. No acute osseous abnormality.     IMPRESSION:   Clear lungs.     KRIS TURK M.D., RADIOLOGY RESIDENT  This document has been electronically signed.  SHARIFA JAMESON M.D., ATTENDING RADIOLOGIST  This document has been electronically signed. Mar 29 2018  3:34PM

## 2018-03-30 NOTE — PHYSICAL THERAPY INITIAL EVALUATION ADULT - ADDITIONAL COMMENTS
Pt lives on the first floor of an apartment building with 4-6 steps to enter building. Pt lives alone and is independent in all ADL's.

## 2018-03-30 NOTE — PROGRESS NOTE ADULT - PROBLEM SELECTOR PLAN 4
social work eval to assist with long term placement. Denied SI/HI.  denied hallucinations.  psyc eval appreciated.   AAOx 3. forgetful. mild dementia. Difficulty taking meds at home due to being forgetful.

## 2018-03-30 NOTE — BEHAVIORAL HEALTH ASSESSMENT NOTE - NSBHCHARTREVIEWLAB_PSY_A_CORE FT
14.3   7.36  )-----------( 196      ( 29 Mar 2018 08:05 )             41.1   03-30    140  |  107  |  34<H>  ----------------------------<  123<H>  4.2   |  18<L>  |  1.39<H>    Ca    9.3      30 Mar 2018 07:17

## 2018-03-30 NOTE — BEHAVIORAL HEALTH ASSESSMENT NOTE - NSBHCHARTREVIEWVS_PSY_A_CORE FT
T(C): 36.7 (30 Mar 2018 11:30), Max: 37 (29 Mar 2018 19:39)  T(F): 98.1 (30 Mar 2018 11:30), Max: 98.6 (29 Mar 2018 19:39)  HR: 61 (30 Mar 2018 10:51) (61 - 74)  BP: 131/77 (30 Mar 2018 10:51) (131/77 - 159/82)  RR: 17 (30 Mar 2018 11:30) (17 - 18)  SpO2: 95% (30 Mar 2018 11:30) (94% - 95%)

## 2018-03-31 RX ADMIN — NYSTATIN CREAM 1 APPLICATION(S): 100000 CREAM TOPICAL at 13:47

## 2018-03-31 RX ADMIN — ATORVASTATIN CALCIUM 40 MILLIGRAM(S): 80 TABLET, FILM COATED ORAL at 21:22

## 2018-03-31 RX ADMIN — Medication 0.1 MILLIGRAM(S): at 05:08

## 2018-03-31 RX ADMIN — Medication 100 MILLIGRAM(S): at 21:22

## 2018-03-31 RX ADMIN — Medication 100 MILLIGRAM(S): at 05:09

## 2018-03-31 RX ADMIN — Medication 75 MILLIGRAM(S): at 17:13

## 2018-03-31 RX ADMIN — Medication 0.1 MILLIGRAM(S): at 21:22

## 2018-03-31 RX ADMIN — LISINOPRIL 20 MILLIGRAM(S): 2.5 TABLET ORAL at 05:08

## 2018-03-31 RX ADMIN — NYSTATIN CREAM 1 APPLICATION(S): 100000 CREAM TOPICAL at 00:05

## 2018-03-31 RX ADMIN — Medication 100 MILLIGRAM(S): at 13:49

## 2018-03-31 RX ADMIN — Medication 0.1 MILLIGRAM(S): at 13:48

## 2018-03-31 RX ADMIN — Medication 75 MILLIGRAM(S): at 05:09

## 2018-03-31 RX ADMIN — SENNA PLUS 2 TABLET(S): 8.6 TABLET ORAL at 21:22

## 2018-03-31 RX ADMIN — APIXABAN 5 MILLIGRAM(S): 2.5 TABLET, FILM COATED ORAL at 05:08

## 2018-03-31 RX ADMIN — APIXABAN 5 MILLIGRAM(S): 2.5 TABLET, FILM COATED ORAL at 17:13

## 2018-03-31 NOTE — PROGRESS NOTE ADULT - ATTENDING COMMENTS
social work eval.  physical therapy.   D/c planning.     - Dr. THOR Ruiz ([facility])  - [phone] D/c planning.   physical therapy.   social work.     - Dr. THOR Ruiz ([facility])  - [phone]

## 2018-03-31 NOTE — PROGRESS NOTE ADULT - PROBLEM SELECTOR PLAN 4
social work eval to assist with long term placement. Denied SI/HI.  denied hallucinations.  psyc eval appreciated.   AAOx 3. forgetful. mild dementia. Difficulty taking meds at home due to being forgetful. social work eval to assist with long term placement. Denied SI/HI.  denied hallucinations.  psyc eval appreciated.   AAOx 3. forgetful. mild dementia. Difficulty taking meds at home due to being forgetful.  vit B12, TSH wnl.

## 2018-04-01 RX ADMIN — APIXABAN 5 MILLIGRAM(S): 2.5 TABLET, FILM COATED ORAL at 17:18

## 2018-04-01 RX ADMIN — SENNA PLUS 2 TABLET(S): 8.6 TABLET ORAL at 21:09

## 2018-04-01 RX ADMIN — LISINOPRIL 20 MILLIGRAM(S): 2.5 TABLET ORAL at 05:01

## 2018-04-01 RX ADMIN — Medication 100 MILLIGRAM(S): at 05:01

## 2018-04-01 RX ADMIN — NYSTATIN CREAM 1 APPLICATION(S): 100000 CREAM TOPICAL at 12:50

## 2018-04-01 RX ADMIN — Medication 75 MILLIGRAM(S): at 05:01

## 2018-04-01 RX ADMIN — Medication 75 MILLIGRAM(S): at 17:18

## 2018-04-01 RX ADMIN — Medication 0.1 MILLIGRAM(S): at 14:36

## 2018-04-01 RX ADMIN — APIXABAN 5 MILLIGRAM(S): 2.5 TABLET, FILM COATED ORAL at 05:01

## 2018-04-01 RX ADMIN — ATORVASTATIN CALCIUM 40 MILLIGRAM(S): 80 TABLET, FILM COATED ORAL at 21:10

## 2018-04-01 RX ADMIN — Medication 100 MILLIGRAM(S): at 21:09

## 2018-04-01 RX ADMIN — Medication 0.1 MILLIGRAM(S): at 21:10

## 2018-04-01 RX ADMIN — NYSTATIN CREAM 1 APPLICATION(S): 100000 CREAM TOPICAL at 00:22

## 2018-04-01 RX ADMIN — Medication 100 MILLIGRAM(S): at 14:36

## 2018-04-01 RX ADMIN — Medication 0.1 MILLIGRAM(S): at 05:01

## 2018-04-01 NOTE — PROGRESS NOTE ADULT - PROBLEM SELECTOR PLAN 4
social work eval to assist with long term placement. Denied SI/HI.  denied hallucinations.  psyc eval appreciated.   AAOx 3. forgetful. mild dementia. Difficulty taking meds at home due to being forgetful.  vit B12, TSH wnl.

## 2018-04-02 PROCEDURE — 99232 SBSQ HOSP IP/OBS MODERATE 35: CPT

## 2018-04-02 RX ADMIN — Medication 100 MILLIGRAM(S): at 21:27

## 2018-04-02 RX ADMIN — Medication 100 MILLIGRAM(S): at 05:09

## 2018-04-02 RX ADMIN — APIXABAN 5 MILLIGRAM(S): 2.5 TABLET, FILM COATED ORAL at 05:09

## 2018-04-02 RX ADMIN — Medication 0.1 MILLIGRAM(S): at 17:41

## 2018-04-02 RX ADMIN — Medication 0.1 MILLIGRAM(S): at 05:10

## 2018-04-02 RX ADMIN — ATORVASTATIN CALCIUM 40 MILLIGRAM(S): 80 TABLET, FILM COATED ORAL at 21:27

## 2018-04-02 RX ADMIN — Medication 75 MILLIGRAM(S): at 05:09

## 2018-04-02 RX ADMIN — Medication 0.1 MILLIGRAM(S): at 21:27

## 2018-04-02 RX ADMIN — SENNA PLUS 2 TABLET(S): 8.6 TABLET ORAL at 21:27

## 2018-04-02 RX ADMIN — APIXABAN 5 MILLIGRAM(S): 2.5 TABLET, FILM COATED ORAL at 17:41

## 2018-04-02 RX ADMIN — Medication 75 MILLIGRAM(S): at 17:42

## 2018-04-02 RX ADMIN — LISINOPRIL 20 MILLIGRAM(S): 2.5 TABLET ORAL at 05:09

## 2018-04-02 RX ADMIN — Medication 100 MILLIGRAM(S): at 17:41

## 2018-04-02 RX ADMIN — NYSTATIN CREAM 1 APPLICATION(S): 100000 CREAM TOPICAL at 00:03

## 2018-04-02 RX ADMIN — NYSTATIN CREAM 1 APPLICATION(S): 100000 CREAM TOPICAL at 12:09

## 2018-04-02 NOTE — PROGRESS NOTE BEHAVIORAL HEALTH - NSBHFUPINTERVALHXFT_PSY_A_CORE
pt denies complaints, no recent agitation, no si/hi. no prns needed. Pt denies mood symptoms, anxiety, psychosis.

## 2018-04-02 NOTE — PROGRESS NOTE BEHAVIORAL HEALTH - NSBHCHARTREVIEWVS_PSY_A_CORE FT
Vital Signs Last 24 Hrs  T(C): 36.3 (02 Apr 2018 04:31), Max: 36.9 (01 Apr 2018 19:50)  T(F): 97.4 (02 Apr 2018 04:31), Max: 98.5 (01 Apr 2018 19:50)  HR: 62 (02 Apr 2018 04:31) (62 - 66)  BP: 148/91 (02 Apr 2018 04:31) (127/73 - 148/91)  BP(mean): --  RR: 18 (02 Apr 2018 04:31) (18 - 18)  SpO2: 96% (02 Apr 2018 04:31) (96% - 97%)

## 2018-04-02 NOTE — PROGRESS NOTE ADULT - ATTENDING COMMENTS
D/c planning. await placement. ;power of .   physical therapy.   social work.     - Dr. THOR Ruiz ([facility])  - [phone]

## 2018-04-03 LAB
ANION GAP SERPL CALC-SCNC: 16 MMOL/L — SIGNIFICANT CHANGE UP (ref 5–17)
BUN SERPL-MCNC: 26 MG/DL — HIGH (ref 7–23)
CALCIUM SERPL-MCNC: 9.1 MG/DL — SIGNIFICANT CHANGE UP (ref 8.4–10.5)
CHLORIDE SERPL-SCNC: 104 MMOL/L — SIGNIFICANT CHANGE UP (ref 96–108)
CO2 SERPL-SCNC: 22 MMOL/L — SIGNIFICANT CHANGE UP (ref 22–31)
CREAT SERPL-MCNC: 1.29 MG/DL — SIGNIFICANT CHANGE UP (ref 0.5–1.3)
GLUCOSE SERPL-MCNC: 117 MG/DL — HIGH (ref 70–99)
HCT VFR BLD CALC: 42.5 % — SIGNIFICANT CHANGE UP (ref 39–50)
HGB BLD-MCNC: 14 G/DL — SIGNIFICANT CHANGE UP (ref 13–17)
MCHC RBC-ENTMCNC: 30.5 PG — SIGNIFICANT CHANGE UP (ref 27–34)
MCHC RBC-ENTMCNC: 32.9 GM/DL — SIGNIFICANT CHANGE UP (ref 32–36)
MCV RBC AUTO: 92.6 FL — SIGNIFICANT CHANGE UP (ref 80–100)
NRBC # BLD: 0 /100 WBCS — SIGNIFICANT CHANGE UP (ref 0–0)
PLATELET # BLD AUTO: 158 K/UL — SIGNIFICANT CHANGE UP (ref 150–400)
POTASSIUM SERPL-MCNC: 3.9 MMOL/L — SIGNIFICANT CHANGE UP (ref 3.5–5.3)
POTASSIUM SERPL-SCNC: 3.9 MMOL/L — SIGNIFICANT CHANGE UP (ref 3.5–5.3)
RBC # BLD: 4.59 M/UL — SIGNIFICANT CHANGE UP (ref 4.2–5.8)
RBC # FLD: 14.2 % — SIGNIFICANT CHANGE UP (ref 10.3–14.5)
SODIUM SERPL-SCNC: 142 MMOL/L — SIGNIFICANT CHANGE UP (ref 135–145)
WBC # BLD: 8.57 K/UL — SIGNIFICANT CHANGE UP (ref 3.8–10.5)
WBC # FLD AUTO: 8.57 K/UL — SIGNIFICANT CHANGE UP (ref 3.8–10.5)

## 2018-04-03 RX ADMIN — Medication 100 MILLIGRAM(S): at 06:13

## 2018-04-03 RX ADMIN — Medication 0.1 MILLIGRAM(S): at 06:13

## 2018-04-03 RX ADMIN — NYSTATIN CREAM 1 APPLICATION(S): 100000 CREAM TOPICAL at 14:57

## 2018-04-03 RX ADMIN — APIXABAN 5 MILLIGRAM(S): 2.5 TABLET, FILM COATED ORAL at 17:36

## 2018-04-03 RX ADMIN — Medication 75 MILLIGRAM(S): at 17:36

## 2018-04-03 RX ADMIN — NYSTATIN CREAM 1 APPLICATION(S): 100000 CREAM TOPICAL at 02:06

## 2018-04-03 RX ADMIN — SENNA PLUS 2 TABLET(S): 8.6 TABLET ORAL at 21:06

## 2018-04-03 RX ADMIN — Medication 75 MILLIGRAM(S): at 06:13

## 2018-04-03 RX ADMIN — Medication 0.1 MILLIGRAM(S): at 21:06

## 2018-04-03 RX ADMIN — LISINOPRIL 20 MILLIGRAM(S): 2.5 TABLET ORAL at 06:13

## 2018-04-03 RX ADMIN — Medication 100 MILLIGRAM(S): at 21:06

## 2018-04-03 RX ADMIN — APIXABAN 5 MILLIGRAM(S): 2.5 TABLET, FILM COATED ORAL at 06:13

## 2018-04-03 RX ADMIN — ATORVASTATIN CALCIUM 40 MILLIGRAM(S): 80 TABLET, FILM COATED ORAL at 21:05

## 2018-04-03 RX ADMIN — Medication 0.1 MILLIGRAM(S): at 14:56

## 2018-04-03 NOTE — PROGRESS NOTE ADULT - ATTENDING COMMENTS
D/c planning. await placement ; power of  in progress.   Per pt, younger brother can no longer take care of him, sick with malignancy.   physical therapy.   social work. rehab vs. long term care.     Terry Bassett will be covering me starting 4/4/18. He can be reached at  if needed.     - Dr. THOR Ruiz ([facility])  - [phone]

## 2018-04-03 NOTE — PROVIDER CONTACT NOTE (OTHER) - ASSESSMENT
Pt a and o x4 pt forgetful at times. pt voids, pt denies pain/ discomfort, denies SOB, denies chest pain, denies headache

## 2018-04-03 NOTE — PROVIDER CONTACT NOTE (OTHER) - ACTION/TREATMENT ORDERED:
No intervention at this time. Continue to monitor BP.
CARMITA Meehan made aware NP at pt bedside to evaluate
PA Gladys Osuna notified per PA to retake BP in 2 hours will notify day nurse

## 2018-04-04 LAB
ANION GAP SERPL CALC-SCNC: 12 MMOL/L — SIGNIFICANT CHANGE UP (ref 5–17)
BUN SERPL-MCNC: 23 MG/DL — SIGNIFICANT CHANGE UP (ref 7–23)
CALCIUM SERPL-MCNC: 8.9 MG/DL — SIGNIFICANT CHANGE UP (ref 8.4–10.5)
CHLORIDE SERPL-SCNC: 105 MMOL/L — SIGNIFICANT CHANGE UP (ref 96–108)
CO2 SERPL-SCNC: 22 MMOL/L — SIGNIFICANT CHANGE UP (ref 22–31)
CREAT SERPL-MCNC: 1.24 MG/DL — SIGNIFICANT CHANGE UP (ref 0.5–1.3)
GLUCOSE SERPL-MCNC: 153 MG/DL — HIGH (ref 70–99)
POTASSIUM SERPL-MCNC: 4.4 MMOL/L — SIGNIFICANT CHANGE UP (ref 3.5–5.3)
POTASSIUM SERPL-SCNC: 4.4 MMOL/L — SIGNIFICANT CHANGE UP (ref 3.5–5.3)
SODIUM SERPL-SCNC: 139 MMOL/L — SIGNIFICANT CHANGE UP (ref 135–145)

## 2018-04-04 RX ORDER — LANOLIN ALCOHOL/MO/W.PET/CERES
3 CREAM (GRAM) TOPICAL ONCE
Qty: 0 | Refills: 0 | Status: COMPLETED | OUTPATIENT
Start: 2018-04-04 | End: 2018-04-04

## 2018-04-04 RX ADMIN — APIXABAN 5 MILLIGRAM(S): 2.5 TABLET, FILM COATED ORAL at 05:10

## 2018-04-04 RX ADMIN — NYSTATIN CREAM 1 APPLICATION(S): 100000 CREAM TOPICAL at 21:12

## 2018-04-04 RX ADMIN — Medication 0.1 MILLIGRAM(S): at 05:10

## 2018-04-04 RX ADMIN — Medication 75 MILLIGRAM(S): at 18:32

## 2018-04-04 RX ADMIN — LISINOPRIL 20 MILLIGRAM(S): 2.5 TABLET ORAL at 05:10

## 2018-04-04 RX ADMIN — Medication 75 MILLIGRAM(S): at 05:09

## 2018-04-04 RX ADMIN — Medication 100 MILLIGRAM(S): at 21:09

## 2018-04-04 RX ADMIN — Medication 0.1 MILLIGRAM(S): at 21:09

## 2018-04-04 RX ADMIN — Medication 0.1 MILLIGRAM(S): at 14:25

## 2018-04-04 RX ADMIN — Medication 100 MILLIGRAM(S): at 05:09

## 2018-04-04 RX ADMIN — SENNA PLUS 2 TABLET(S): 8.6 TABLET ORAL at 21:09

## 2018-04-04 RX ADMIN — ATORVASTATIN CALCIUM 40 MILLIGRAM(S): 80 TABLET, FILM COATED ORAL at 21:10

## 2018-04-04 RX ADMIN — Medication 3 MILLIGRAM(S): at 21:09

## 2018-04-04 RX ADMIN — NYSTATIN CREAM 1 APPLICATION(S): 100000 CREAM TOPICAL at 05:16

## 2018-04-04 RX ADMIN — APIXABAN 5 MILLIGRAM(S): 2.5 TABLET, FILM COATED ORAL at 18:32

## 2018-04-05 VITALS
TEMPERATURE: 98 F | HEART RATE: 69 BPM | SYSTOLIC BLOOD PRESSURE: 123 MMHG | DIASTOLIC BLOOD PRESSURE: 71 MMHG | RESPIRATION RATE: 18 BRPM | OXYGEN SATURATION: 96 %

## 2018-04-05 PROCEDURE — 93005 ELECTROCARDIOGRAM TRACING: CPT

## 2018-04-05 PROCEDURE — 80048 BASIC METABOLIC PNL TOTAL CA: CPT

## 2018-04-05 PROCEDURE — 83735 ASSAY OF MAGNESIUM: CPT

## 2018-04-05 PROCEDURE — 82607 VITAMIN B-12: CPT

## 2018-04-05 PROCEDURE — 71045 X-RAY EXAM CHEST 1 VIEW: CPT

## 2018-04-05 PROCEDURE — 80053 COMPREHEN METABOLIC PANEL: CPT

## 2018-04-05 PROCEDURE — 97161 PT EVAL LOW COMPLEX 20 MIN: CPT

## 2018-04-05 PROCEDURE — 85027 COMPLETE CBC AUTOMATED: CPT

## 2018-04-05 PROCEDURE — 84443 ASSAY THYROID STIM HORMONE: CPT

## 2018-04-05 PROCEDURE — 99285 EMERGENCY DEPT VISIT HI MDM: CPT

## 2018-04-05 PROCEDURE — 84100 ASSAY OF PHOSPHORUS: CPT

## 2018-04-05 RX ORDER — LISINOPRIL 2.5 MG/1
1 TABLET ORAL
Qty: 0 | Refills: 0 | COMMUNITY
Start: 2018-04-05

## 2018-04-05 RX ORDER — HALOPERIDOL DECANOATE 100 MG/ML
1 INJECTION INTRAMUSCULAR
Qty: 0 | Refills: 0 | COMMUNITY
Start: 2018-04-05

## 2018-04-05 RX ORDER — ATORVASTATIN CALCIUM 80 MG/1
1 TABLET, FILM COATED ORAL
Qty: 0 | Refills: 0 | COMMUNITY
Start: 2018-04-05

## 2018-04-05 RX ORDER — SENNA PLUS 8.6 MG/1
2 TABLET ORAL
Qty: 0 | Refills: 0 | COMMUNITY
Start: 2018-04-05

## 2018-04-05 RX ORDER — DOCUSATE SODIUM 100 MG
1 CAPSULE ORAL
Qty: 0 | Refills: 0 | COMMUNITY
Start: 2018-04-05

## 2018-04-05 RX ORDER — NYSTATIN CREAM 100000 [USP'U]/G
1 CREAM TOPICAL
Qty: 0 | Refills: 0 | COMMUNITY
Start: 2018-04-05

## 2018-04-05 RX ORDER — APIXABAN 2.5 MG/1
1 TABLET, FILM COATED ORAL
Qty: 0 | Refills: 0 | COMMUNITY
Start: 2018-04-05

## 2018-04-05 RX ORDER — METOPROLOL TARTRATE 50 MG
1 TABLET ORAL
Qty: 0 | Refills: 0 | COMMUNITY
Start: 2018-04-05

## 2018-04-05 RX ADMIN — APIXABAN 5 MILLIGRAM(S): 2.5 TABLET, FILM COATED ORAL at 05:34

## 2018-04-05 RX ADMIN — NYSTATIN CREAM 1 APPLICATION(S): 100000 CREAM TOPICAL at 05:34

## 2018-04-05 RX ADMIN — Medication 0.1 MILLIGRAM(S): at 05:34

## 2018-04-05 RX ADMIN — Medication 100 MILLIGRAM(S): at 13:13

## 2018-04-05 RX ADMIN — Medication 75 MILLIGRAM(S): at 05:33

## 2018-04-05 RX ADMIN — LISINOPRIL 20 MILLIGRAM(S): 2.5 TABLET ORAL at 05:34

## 2018-04-05 RX ADMIN — Medication 100 MILLIGRAM(S): at 05:33

## 2018-04-05 RX ADMIN — Medication 0.1 MILLIGRAM(S): at 13:13

## 2018-04-05 NOTE — PROGRESS NOTE ADULT - PROBLEM SELECTOR PROBLEM 2
Coronary artery disease involving native coronary artery of native heart without angina pectoris

## 2018-04-05 NOTE — PROGRESS NOTE ADULT - PROBLEM SELECTOR PLAN 1
continue home meds, would not uptitrate home doses as he probably has not been taking medications.  BP stable.   on Clonidine, Lisinopril and Metoprolol.
BP stable.   c/w Clonidine TID, Lisinopril and Metoprolol.
BP stable.   c/w Clonidine, Lisinopril and Metoprolol.
BP stable.   c/w Clonidine, Lisinopril and Metoprolol.
continue Clonidine TID, Lisinopril and Metoprolol.

## 2018-04-05 NOTE — PROGRESS NOTE ADULT - SUBJECTIVE AND OBJECTIVE BOX
Patient is a 72y old  Male who presents with a chief complaint of Elevated blood pressure (29 Mar 2018 11:06)      SUBJECTIVE / OVERNIGHT EVENTS:  No overnight events.  No complaints.  No cp, sob, abdominal pain.  No n/v/d. no HA/dizziness.       Vital Signs Last 24 Hrs  T(C): 36.7 (29 Mar 2018 11:40), Max: 36.7 (28 Mar 2018 21:26)  T(F): 98 (29 Mar 2018 11:40), Max: 98 (28 Mar 2018 21:26)  HR: 74 (29 Mar 2018 17:49) (60 - 78)  BP: 153/96 (29 Mar 2018 17:49) (119/80 - 158/84)  BP(mean): --  RR: 18 (29 Mar 2018 11:40) (18 - 18)  SpO2: 96% (29 Mar 2018 11:40) (92% - 96%)  I&O's Summary    28 Mar 2018 07:01  -  29 Mar 2018 07:00  --------------------------------------------------------  IN: 720 mL / OUT: 0 mL / NET: 720 mL    29 Mar 2018 07:01  -  29 Mar 2018 18:42  --------------------------------------------------------  IN: 480 mL / OUT: 0 mL / NET: 480 mL        PHYSICAL EXAM:  GENERAL: NAD, Comfortable  HEAD:  Atraumatic, Normocephalic  EYES: EOMI, PERRLA, conjunctiva and sclera clear  NECK: Supple, No JVD  CHEST/LUNG: Clear to auscultation bilaterally; No wheeze  HEART: Regular rate and rhythm; No murmurs, rubs, or gallops  ABDOMEN: Soft, Nontender, Nondistended; Bowel sounds present  Neuro: AAO x 3, no focal deficit, 5/5 b/l extremities  : large left sided massive scrotal hernia. no pain, no sign of strangulation.   EXTREMITIES:  2+ Peripheral Pulses, No clubbing, cyanosis, or edema  SKIN: No rashes or lesions    LABS:                        14.3   7.36  )-----------( 196      ( 29 Mar 2018 08:05 )             41.1     03-29    141  |  104  |  38<H>  ----------------------------<  138<H>  4.1   |  24  |  1.53<H>    Ca    9.5      29 Mar 2018 06:52  Phos  3.3     03-28  Mg     2.1     03-28    TPro  8.2  /  Alb  4.2  /  TBili  0.7  /  DBili  x   /  AST  24  /  ALT  17  /  AlkPhos  79  03-28      CAPILLARY BLOOD GLUCOSE                RADIOLOGY & ADDITIONAL TESTS:    Imaging Personally Reviewed:  [x] YES  [ ] NO    Consultant(s) Notes Reviewed:  [x] YES  [ ] NO      MEDICATIONS  (STANDING):  apixaban 5 milliGRAM(s) Oral every 12 hours  atorvastatin 40 milliGRAM(s) Oral at bedtime  cloNIDine 0.1 milliGRAM(s) Oral three times a day  influenza   Vaccine 0.5 milliLiter(s) IntraMuscular once  lisinopril 20 milliGRAM(s) Oral daily  metoprolol tartrate 75 milliGRAM(s) Oral two times a day  sodium chloride 0.9%. 1000 milliLiter(s) (60 mL/Hr) IV Continuous <Continuous>    MEDICATIONS  (PRN):      Care Discussed with Consultants/Other Providers [x] YES  [ ] NO    HEALTH ISSUES - PROBLEM Dx:  Chronic atrial fibrillation: Chronic atrial fibrillation  Dementia without behavioral disturbance, unspecified dementia type: Dementia without behavioral disturbance, unspecified dementia type  Pure hypercholesterolemia: Pure hypercholesterolemia  Coronary artery disease involving native coronary artery of native heart without angina pectoris: Coronary artery disease involving native coronary artery of native heart without angina pectoris  Uncontrolled hypertension: Uncontrolled hypertension
No new s ymptoms in bed    Vital Signs Last 24 Hrs  T(C): 36.2 (04 Apr 2018 05:08), Max: 36.9 (03 Apr 2018 19:58)  T(F): 97.2 (04 Apr 2018 05:08), Max: 98.5 (03 Apr 2018 19:58)  HR: 61 (04 Apr 2018 05:08) (61 - 86)  BP: 141/90 (04 Apr 2018 05:08) (141/90 - 160/92)  BP(mean): --  RR: 17 (04 Apr 2018 05:08) (17 - 18)  SpO2: 95% (04 Apr 2018 05:08) (93% - 96%)    GENERAL: NAD, Comfortable  HEAD:  Atraumatic, Normocephalic  EYES: EOMI, PERRLA, conjunctiva and sclera clear  NECK: Supple, No JVD  CHEST/LUNG: Clear to auscultation bilaterally; No wheeze  HEART: Regular rate and rhythm; No murmurs, rubs, or gallops  ABDOMEN: Soft, Nontender, Nondistended; Bowel sounds present  Neuro: AAO x 3, no focal deficit, 5/5 b/l extremities  : large left sided massive scrotal hernia. no pain, no sign of strangulation.   EXTREMITIES:  2+ Peripheral Pulses, No clubbing, cyanosis, or edema    LABS:                        14.0   8.57  )-----------( 158      ( 03 Apr 2018 07:27 )             42.5     04-04    139  |  105  |  23  ----------------------------<  153<H>  4.4   |  22  |  1.24    Ca    8.9      04 Apr 2018 07:08        CAPILLARY BLOOD GLUCOSE
Patient is a 72y old  Male who presents with a chief complaint of Elevated blood pressure (29 Mar 2018 11:06)      SUBJECTIVE / OVERNIGHT EVENTS:  No events.  Feel well.  no complaints.   no cp, no sob, no n/v/d.  no abd pain.       Vital Signs Last 24 Hrs  T(C): 36.9 (02 Apr 2018 13:35), Max: 36.9 (01 Apr 2018 19:50)  T(F): 98.4 (02 Apr 2018 13:35), Max: 98.5 (01 Apr 2018 19:50)  HR: 66 (02 Apr 2018 13:35) (62 - 66)  BP: 135/84 (02 Apr 2018 13:35) (133/87 - 148/91)  BP(mean): --  RR: 18 (02 Apr 2018 13:35) (18 - 18)  SpO2: 94% (02 Apr 2018 13:35) (94% - 97%)  I&O's Summary    01 Apr 2018 07:01  -  02 Apr 2018 07:00  --------------------------------------------------------  IN: 760 mL / OUT: 0 mL / NET: 760 mL    02 Apr 2018 07:01  -  02 Apr 2018 17:49  --------------------------------------------------------  IN: 240 mL / OUT: 0 mL / NET: 240 mL        PHYSICAL EXAM:  GENERAL: NAD, Comfortable  HEAD:  Atraumatic, Normocephalic  EYES: EOMI, PERRLA, conjunctiva and sclera clear  NECK: Supple, No JVD  CHEST/LUNG: Clear to auscultation bilaterally; No wheeze  HEART: Regular rate and rhythm; No murmurs, rubs, or gallops  ABDOMEN: Soft, Nontender, Nondistended; Bowel sounds present  Neuro: AAO x 3, no focal deficit, 5/5 b/l extremities  : large left sided massive scrotal hernia. no pain, no sign of strangulation.   EXTREMITIES:  2+ Peripheral Pulses, No clubbing, cyanosis, or edema  SKIN: No rashes or lesions      LABS:            CAPILLARY BLOOD GLUCOSE                RADIOLOGY & ADDITIONAL TESTS:    Imaging Personally Reviewed:  [x] YES  [ ] NO    Consultant(s) Notes Reviewed:  [x] YES  [ ] NO      MEDICATIONS  (STANDING):  apixaban 5 milliGRAM(s) Oral every 12 hours  atorvastatin 40 milliGRAM(s) Oral at bedtime  cloNIDine 0.1 milliGRAM(s) Oral three times a day  docusate sodium 100 milliGRAM(s) Oral three times a day  influenza   Vaccine 0.5 milliLiter(s) IntraMuscular once  lisinopril 20 milliGRAM(s) Oral daily  metoprolol tartrate 75 milliGRAM(s) Oral two times a day  nystatin Powder 1 Application(s) Topical two times a day  senna 2 Tablet(s) Oral at bedtime    MEDICATIONS  (PRN):  haloperidol     Tablet 1 milliGRAM(s) Oral every 6 hours PRN agitation      Care Discussed with Consultants/Other Providers [x] YES  [ ] NO    HEALTH ISSUES - PROBLEM Dx:  Neurocognitive disorder  Personality disorder, unspecified  Scrotal hernia: Scrotal hernia  Chronic atrial fibrillation: Chronic atrial fibrillation  Dementia without behavioral disturbance, unspecified dementia type: Dementia without behavioral disturbance, unspecified dementia type  Pure hypercholesterolemia: Pure hypercholesterolemia  Coronary artery disease involving native coronary artery of native heart without angina pectoris: Coronary artery disease involving native coronary artery of native heart without angina pectoris  Uncontrolled hypertension: Uncontrolled hypertension
Patient is a 72y old  Male who presents with a chief complaint of Elevated blood pressure (29 Mar 2018 11:06)      SUBJECTIVE / OVERNIGHT EVENTS:  No events.  Feel well.  no complaints.   no cp, no sob, no n/v/d.  no abd pain.       Vital Signs Last 24 Hrs  T(C): 36.9 (31 Mar 2018 13:40), Max: 36.9 (30 Mar 2018 19:06)  T(F): 98.4 (31 Mar 2018 13:40), Max: 98.4 (30 Mar 2018 19:06)  HR: 78 (31 Mar 2018 13:40) (57 - 78)  BP: 162/82 (31 Mar 2018 13:40) (133/84 - 162/82)  BP(mean): --  RR: 18 (31 Mar 2018 13:40) (18 - 19)  SpO2: 95% (31 Mar 2018 13:40) (93% - 96%)  I&O's Summary    30 Mar 2018 07:01  -  31 Mar 2018 07:00  --------------------------------------------------------  IN: 1420 mL / OUT: 0 mL / NET: 1420 mL    31 Mar 2018 07:01  -  31 Mar 2018 15:07  --------------------------------------------------------  IN: 840 mL / OUT: 0 mL / NET: 840 mL      PHYSICAL EXAM:  GENERAL: NAD, Comfortable  HEAD:  Atraumatic, Normocephalic  EYES: EOMI, PERRLA, conjunctiva and sclera clear  NECK: Supple, No JVD  CHEST/LUNG: Clear to auscultation bilaterally; No wheeze  HEART: Regular rate and rhythm; No murmurs, rubs, or gallops  ABDOMEN: Soft, Nontender, Nondistended; Bowel sounds present  Neuro: AAO x 3, no focal deficit, 5/5 b/l extremities  : large left sided massive scrotal hernia. no pain, no sign of strangulation.   EXTREMITIES:  2+ Peripheral Pulses, No clubbing, cyanosis, or edema  SKIN: No rashes or lesions        LABS:    03-30    140  |  107  |  34<H>  ----------------------------<  123<H>  4.2   |  18<L>  |  1.39<H>    Ca    9.3      30 Mar 2018 07:17        CAPILLARY BLOOD GLUCOSE                RADIOLOGY & ADDITIONAL TESTS:    Imaging Personally Reviewed:  [x] YES  [ ] NO    Consultant(s) Notes Reviewed:  [x] YES  [ ] NO      MEDICATIONS  (STANDING):  apixaban 5 milliGRAM(s) Oral every 12 hours  atorvastatin 40 milliGRAM(s) Oral at bedtime  cloNIDine 0.1 milliGRAM(s) Oral three times a day  docusate sodium 100 milliGRAM(s) Oral three times a day  influenza   Vaccine 0.5 milliLiter(s) IntraMuscular once  lisinopril 20 milliGRAM(s) Oral daily  metoprolol tartrate 75 milliGRAM(s) Oral two times a day  nystatin Powder 1 Application(s) Topical two times a day  senna 2 Tablet(s) Oral at bedtime  sodium chloride 0.9%. 1000 milliLiter(s) (60 mL/Hr) IV Continuous <Continuous>    MEDICATIONS  (PRN):  haloperidol     Tablet 1 milliGRAM(s) Oral every 6 hours PRN agitation      Care Discussed with Consultants/Other Providers [x] YES  [ ] NO    HEALTH ISSUES - PROBLEM Dx:  Neurocognitive disorder  Personality disorder, unspecified  Scrotal hernia: Scrotal hernia  Chronic atrial fibrillation: Chronic atrial fibrillation  Dementia without behavioral disturbance, unspecified dementia type: Dementia without behavioral disturbance, unspecified dementia type  Pure hypercholesterolemia: Pure hypercholesterolemia  Coronary artery disease involving native coronary artery of native heart without angina pectoris: Coronary artery disease involving native coronary artery of native heart without angina pectoris  Uncontrolled hypertension: Uncontrolled hypertension
Patient is a 72y old  Male who presents with a chief complaint of Elevated blood pressure (29 Mar 2018 11:06)      SUBJECTIVE / OVERNIGHT EVENTS:  No overnight events.  No complaints.  No cp, sob, abdominal pain.  No n/v/d. no HA/dizziness.       Vital Signs Last 24 Hrs  T(C): 36.4 (01 Apr 2018 12:02), Max: 36.9 (31 Mar 2018 13:40)  T(F): 97.6 (01 Apr 2018 12:02), Max: 98.4 (31 Mar 2018 13:40)  HR: 70 (01 Apr 2018 12:02) (54 - 78)  BP: 124/79 (01 Apr 2018 12:02) (124/79 - 165/93)  BP(mean): --  RR: 18 (01 Apr 2018 12:02) (18 - 18)  SpO2: 96% (01 Apr 2018 12:02) (94% - 96%)  I&O's Summary    31 Mar 2018 07:01  -  01 Apr 2018 07:00  --------------------------------------------------------  IN: 1560 mL / OUT: 0 mL / NET: 1560 mL    01 Apr 2018 07:01  -  01 Apr 2018 13:28  --------------------------------------------------------  IN: 200 mL / OUT: 0 mL / NET: 200 mL        PHYSICAL EXAM:  GENERAL: NAD, Comfortable  HEAD:  Atraumatic, Normocephalic  EYES: EOMI, PERRLA, conjunctiva and sclera clear  NECK: Supple, No JVD  CHEST/LUNG: Clear to auscultation bilaterally; No wheeze  HEART: Regular rate and rhythm; No murmurs, rubs, or gallops  ABDOMEN: Soft, Nontender, Nondistended; Bowel sounds present  Neuro: AAO x 3, no focal deficit, 5/5 b/l extremities  : large left sided massive scrotal hernia. no pain, no sign of strangulation.   EXTREMITIES:  2+ Peripheral Pulses, No clubbing, cyanosis, or edema  SKIN: No rashes or lesions      LABS:            CAPILLARY BLOOD GLUCOSE                RADIOLOGY & ADDITIONAL TESTS:    Imaging Personally Reviewed:  [x] YES  [ ] NO    Consultant(s) Notes Reviewed:  [x] YES  [ ] NO      MEDICATIONS  (STANDING):  apixaban 5 milliGRAM(s) Oral every 12 hours  atorvastatin 40 milliGRAM(s) Oral at bedtime  cloNIDine 0.1 milliGRAM(s) Oral three times a day  docusate sodium 100 milliGRAM(s) Oral three times a day  influenza   Vaccine 0.5 milliLiter(s) IntraMuscular once  lisinopril 20 milliGRAM(s) Oral daily  metoprolol tartrate 75 milliGRAM(s) Oral two times a day  nystatin Powder 1 Application(s) Topical two times a day  senna 2 Tablet(s) Oral at bedtime  sodium chloride 0.9%. 1000 milliLiter(s) (60 mL/Hr) IV Continuous <Continuous>    MEDICATIONS  (PRN):  haloperidol     Tablet 1 milliGRAM(s) Oral every 6 hours PRN agitation      Care Discussed with Consultants/Other Providers [x] YES  [ ] NO    HEALTH ISSUES - PROBLEM Dx:  Neurocognitive disorder  Personality disorder, unspecified  Scrotal hernia: Scrotal hernia  Chronic atrial fibrillation: Chronic atrial fibrillation  Dementia without behavioral disturbance, unspecified dementia type: Dementia without behavioral disturbance, unspecified dementia type  Pure hypercholesterolemia: Pure hypercholesterolemia  Coronary artery disease involving native coronary artery of native heart without angina pectoris: Coronary artery disease involving native coronary artery of native heart without angina pectoris  Uncontrolled hypertension: Uncontrolled hypertension
Patient is a 72y old  Male who presents with a chief complaint of Elevated blood pressure (29 Mar 2018 11:06)      SUBJECTIVE / OVERNIGHT EVENTS:  No overnight events.  No complaints.  No cp, sob, abdominal pain.  No n/v/d. no HA/dizziness.   He states that he yelled at the home aide for not giving him enough water when he is taking his meds.       Vital Signs Last 24 Hrs  T(C): 36.7 (30 Mar 2018 11:30), Max: 37 (29 Mar 2018 19:39)  T(F): 98.1 (30 Mar 2018 11:30), Max: 98.6 (29 Mar 2018 19:39)  HR: 71 (30 Mar 2018 17:04) (61 - 71)  BP: 149/91 (30 Mar 2018 17:04) (131/77 - 159/82)  BP(mean): --  RR: 17 (30 Mar 2018 11:30) (17 - 18)  SpO2: 95% (30 Mar 2018 11:30) (94% - 95%)  I&O's Summary    29 Mar 2018 07:01  -  30 Mar 2018 07:00  --------------------------------------------------------  IN: 1920 mL / OUT: 0 mL / NET: 1920 mL    30 Mar 2018 07:01  -  30 Mar 2018 18:02  --------------------------------------------------------  IN: 1240 mL / OUT: 0 mL / NET: 1240 mL      PHYSICAL EXAM:  GENERAL: NAD, Comfortable  HEAD:  Atraumatic, Normocephalic  EYES: EOMI, PERRLA, conjunctiva and sclera clear  NECK: Supple, No JVD  CHEST/LUNG: Clear to auscultation bilaterally; No wheeze  HEART: Regular rate and rhythm; No murmurs, rubs, or gallops  ABDOMEN: Soft, Nontender, Nondistended; Bowel sounds present  Neuro: AAO x 3, no focal deficit, 5/5 b/l extremities  : large left sided massive scrotal hernia. no pain, no sign of strangulation.   EXTREMITIES:  2+ Peripheral Pulses, No clubbing, cyanosis, or edema  SKIN: No rashes or lesions      LABS:                        14.3   7.36  )-----------( 196      ( 29 Mar 2018 08:05 )             41.1     03-30    140  |  107  |  34<H>  ----------------------------<  123<H>  4.2   |  18<L>  |  1.39<H>    Ca    9.3      30 Mar 2018 07:17        CAPILLARY BLOOD GLUCOSE                RADIOLOGY & ADDITIONAL TESTS:    Imaging Personally Reviewed:  [x] YES  [ ] NO    Consultant(s) Notes Reviewed:  [x] YES  [ ] NO      MEDICATIONS  (STANDING):  apixaban 5 milliGRAM(s) Oral every 12 hours  atorvastatin 40 milliGRAM(s) Oral at bedtime  cloNIDine 0.1 milliGRAM(s) Oral three times a day  docusate sodium 100 milliGRAM(s) Oral three times a day  influenza   Vaccine 0.5 milliLiter(s) IntraMuscular once  lisinopril 20 milliGRAM(s) Oral daily  metoprolol tartrate 75 milliGRAM(s) Oral two times a day  nystatin Powder 1 Application(s) Topical two times a day  senna 2 Tablet(s) Oral at bedtime  sodium chloride 0.9%. 1000 milliLiter(s) (60 mL/Hr) IV Continuous <Continuous>    MEDICATIONS  (PRN):  haloperidol     Tablet 1 milliGRAM(s) Oral every 6 hours PRN agitation      Care Discussed with Consultants/Other Providers [x] YES  [ ] NO    HEALTH ISSUES - PROBLEM Dx:  Neurocognitive disorder  Personality disorder, unspecified  Scrotal hernia: Scrotal hernia  Chronic atrial fibrillation: Chronic atrial fibrillation  Dementia without behavioral disturbance, unspecified dementia type: Dementia without behavioral disturbance, unspecified dementia type  Pure hypercholesterolemia: Pure hypercholesterolemia  Coronary artery disease involving native coronary artery of native heart without angina pectoris: Coronary artery disease involving native coronary artery of native heart without angina pectoris  Uncontrolled hypertension: Uncontrolled hypertension
Patient is a 72y old  Male who presents with a chief complaint of Elevated blood pressure (29 Mar 2018 11:06)      SUBJECTIVE / OVERNIGHT EVENTS:  no events.  still waiting for rehab/long term.  pt feel well.  no cp, no sob, no n/v/d. no abdominal pain.  no headache, no dizziness.       Vital Signs Last 24 Hrs  T(C): 36.8 (03 Apr 2018 11:12), Max: 37.2 (02 Apr 2018 20:22)  T(F): 98.3 (03 Apr 2018 11:12), Max: 98.9 (02 Apr 2018 20:22)  HR: 73 (03 Apr 2018 17:35) (62 - 86)  BP: 159/97 (03 Apr 2018 17:35) (137/80 - 172/95)  BP(mean): --  RR: 18 (03 Apr 2018 11:12) (18 - 18)  SpO2: 93% (03 Apr 2018 11:12) (93% - 95%)  I&O's Summary    02 Apr 2018 07:01  -  03 Apr 2018 07:00  --------------------------------------------------------  IN: 1180 mL / OUT: 2 mL / NET: 1178 mL    03 Apr 2018 07:01  -  03 Apr 2018 19:13  --------------------------------------------------------  IN: 600 mL / OUT: 250 mL / NET: 350 mL      PHYSICAL EXAM:  GENERAL: NAD, Comfortable  HEAD:  Atraumatic, Normocephalic  EYES: EOMI, PERRLA, conjunctiva and sclera clear  NECK: Supple, No JVD  CHEST/LUNG: Clear to auscultation bilaterally; No wheeze  HEART: Regular rate and rhythm; No murmurs, rubs, or gallops  ABDOMEN: Soft, Nontender, Nondistended; Bowel sounds present  Neuro: AAO x 3, no focal deficit, 5/5 b/l extremities  : large left sided massive scrotal hernia. no pain, no sign of strangulation.   EXTREMITIES:  2+ Peripheral Pulses, No clubbing, cyanosis, or edema  SKIN: No rashes or lesions    LABS:                        14.0   8.57  )-----------( 158      ( 03 Apr 2018 07:27 )             42.5     04-03    142  |  104  |  26<H>  ----------------------------<  117<H>  3.9   |  22  |  1.29    Ca    9.1      03 Apr 2018 06:20        CAPILLARY BLOOD GLUCOSE                RADIOLOGY & ADDITIONAL TESTS:    Imaging Personally Reviewed:  [x] YES  [ ] NO    Consultant(s) Notes Reviewed:  [x] YES  [ ] NO      MEDICATIONS  (STANDING):  apixaban 5 milliGRAM(s) Oral every 12 hours  atorvastatin 40 milliGRAM(s) Oral at bedtime  cloNIDine 0.1 milliGRAM(s) Oral three times a day  docusate sodium 100 milliGRAM(s) Oral three times a day  influenza   Vaccine 0.5 milliLiter(s) IntraMuscular once  lisinopril 20 milliGRAM(s) Oral daily  metoprolol tartrate 75 milliGRAM(s) Oral two times a day  nystatin Powder 1 Application(s) Topical two times a day  senna 2 Tablet(s) Oral at bedtime    MEDICATIONS  (PRN):  haloperidol     Tablet 1 milliGRAM(s) Oral every 6 hours PRN agitation      Care Discussed with Consultants/Other Providers [x] YES  [ ] NO    HEALTH ISSUES - PROBLEM Dx:  Neurocognitive disorder  Personality disorder, unspecified  Scrotal hernia: Scrotal hernia  Chronic atrial fibrillation: Chronic atrial fibrillation  Dementia without behavioral disturbance, unspecified dementia type: Dementia without behavioral disturbance, unspecified dementia type  Pure hypercholesterolemia: Pure hypercholesterolemia  Coronary artery disease involving native coronary artery of native heart without angina pectoris: Coronary artery disease involving native coronary artery of native heart without angina pectoris  Uncontrolled hypertension: Uncontrolled hypertension
no new symptoms    Vital Signs Last 24 Hrs  T(C): 36.8 (05 Apr 2018 10:59), Max: 37.2 (04 Apr 2018 20:43)  T(F): 98.3 (05 Apr 2018 10:59), Max: 98.9 (04 Apr 2018 20:43)  HR: 69 (05 Apr 2018 10:59) (60 - 80)  BP: 123/71 (05 Apr 2018 10:59) (117/80 - 150/96)  BP(mean): --  RR: 18 (05 Apr 2018 10:59) (18 - 18)  SpO2: 96% (05 Apr 2018 10:59) (95% - 96%)    GENERAL: NAD, Comfortable  HEAD:  Atraumatic, Normocephalic  EYES: EOMI, PERRLA, conjunctiva and sclera clear  NECK: Supple, No JVD  CHEST/LUNG: Clear to auscultation bilaterally; No wheeze  HEART: Regular rate and rhythm; No murmurs, rubs, or gallops  ABDOMEN: Soft, Nontender, Nondistended; Bowel sounds present  Neuro: AAO x 3, no focal deficit, 5/5 b/l extremities  : large left sided massive scrotal hernia. no pain, no sign of strangulation.   EXTREMITIES:  2+ Peripheral Pulses, No clubbing, cyanosis, or edema    LABS:    04-04    139  |  105  |  23  ----------------------------<  153<H>  4.4   |  22  |  1.24    Ca    8.9      04 Apr 2018 07:08        CAPILLARY BLOOD GLUCOSE

## 2018-04-05 NOTE — PROGRESS NOTE ADULT - PROBLEM SELECTOR PROBLEM 3
Pure hypercholesterolemia

## 2018-04-05 NOTE — PROGRESS NOTE ADULT - PROBLEM SELECTOR PLAN 3
continue statin

## 2018-04-05 NOTE — PROGRESS NOTE ADULT - PROBLEM SELECTOR PROBLEM 1
Uncontrolled hypertension

## 2018-04-05 NOTE — PROGRESS NOTE ADULT - ASSESSMENT
72 yr old male w/ a hx of dementia, CAD s/p CABG, essential HTN, HLD, and inguinal hernia presents to the ED w/ HTN, inability to care for himself. Patient is labeled with a h/o dementia, however he is A and O x3, and has insight into his "cognitive" deficits.  May want to consider alternative diagnosis such as depression with psuedodementia.
72 yr old male w/ a hx of dementia, CAD s/p CABG, essential HTN, HLD, and inguinal hernia presents to the ED w/ HTN, inability to care for himself. Patient is labeled with a h/o dementia, however he is A and O x3, and has insight into his "cognitive" deficits. Forgetful.

## 2018-09-29 NOTE — ED PROVIDER NOTE - CRITICAL CARE INDICATION, MLM
patient was critically ill... Patient was critically ill with a high probability of imminent or life threatening deterioration. no

## 2018-12-27 PROBLEM — I10 ESSENTIAL (PRIMARY) HYPERTENSION: Chronic | Status: ACTIVE | Noted: 2018-03-28

## 2019-01-03 ENCOUNTER — OUTPATIENT (OUTPATIENT)
Dept: OUTPATIENT SERVICES | Facility: HOSPITAL | Age: 74
LOS: 1 days | Discharge: HOME | End: 2019-01-03
Payer: MEDICARE

## 2019-01-03 DIAGNOSIS — I48.91 UNSPECIFIED ATRIAL FIBRILLATION: ICD-10-CM

## 2019-01-03 DIAGNOSIS — Z98.890 OTHER SPECIFIED POSTPROCEDURAL STATES: Chronic | ICD-10-CM

## 2019-01-03 DIAGNOSIS — Z95.1 PRESENCE OF AORTOCORONARY BYPASS GRAFT: Chronic | ICD-10-CM

## 2019-01-03 PROCEDURE — 93016 CV STRESS TEST SUPVJ ONLY: CPT

## 2019-01-03 PROCEDURE — 93018 CV STRESS TEST I&R ONLY: CPT

## 2019-01-03 RX ORDER — LACTULOSE 10 G/15ML
0 SOLUTION ORAL
Qty: 0 | Refills: 0 | COMMUNITY

## 2019-01-03 RX ORDER — AMLODIPINE BESYLATE 2.5 MG/1
1 TABLET ORAL
Qty: 0 | Refills: 0 | COMMUNITY

## 2019-01-03 RX ORDER — LABETALOL HCL 100 MG
1 TABLET ORAL
Qty: 0 | Refills: 0 | COMMUNITY

## 2019-01-03 RX ORDER — TRAZODONE HCL 50 MG
0 TABLET ORAL
Qty: 0 | Refills: 0 | COMMUNITY

## 2019-09-20 NOTE — PATIENT PROFILE ADULT. - PRO ANTICIPATED DISCH DISP
09-19 Na 135 mmol/L Glu 137 mg/dL<H> K+ 3.9 mmol/L Cr 6.88 mg/dL<H> BUN 28 mg/dL<H> Phos 5.0 mg/dL<H> Alb 2.2 g/dL<L> PAB n/a   Hgb 10.7 g/dL<L> Hct 35.5 %<L> HgA1C n/a     24Hr FS:127 mg/dL  135 mg/dL
home w/ assist
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
## 2020-11-25 NOTE — BEHAVIORAL HEALTH ASSESSMENT NOTE - AFFECT RANGE
COVID-19 PCR test completed. Patient handout For Patients Who Have Been Tested for Covid-19 (Coronavirus) was given to the patient, which includes test result notification process.     Full

## 2023-03-21 NOTE — PHYSICAL THERAPY INITIAL EVALUATION ADULT - NAME OF DISCHARGE PLANNER
[facility] Medication Refill Protocol criteria for refilling lisinopril (Zestril) 40 mg and atenolol (Tenormin) 50 mg passed.     Med last ordered by PCP (Y/N)?: yes    Followed up after starting/changing med, (n/a if long-term)?: n/a     Related encounter(s) and/or diagnoses on Problem List: LOV 8/25/22, rtc 6 mo    NxOV: none     Action(s): Refilled medications per protocol. Routed encounter to PSRs to schedule overdue f/u    
APPT MADE 5/1  
REFILL TEAM TO CALL PATIENTS.   
Soledad was due for follow-up (htn, cholesterol, vit D, anemia) as of 2/25/23. Can you please call her to schedule an appt? I refilled her lisinopril and atenolol. Thank you!  
The Refill Team just received a link for training on Primary Care Optimization scheduling on Friday; none of us are currently trained in any scheduling. We were advised that PSRs would continue to support scheduling until our team is fully trained.       Additionally, when we are fully trained, we are only to be scheduling follow ups for diabetes and hypertension  
TERESA Cabrera

## 2024-06-07 NOTE — PROGRESS NOTE ADULT - ASSESSMENT
72 M h/o HTN, HLD, CAD s/p CABG (10 years ago, no stents), L eye cataract presents a/w L-sided weakness, unsteadiness, and disorientation and found to have fever, extremely elevated BP and Afib.
72-yo Male with PMHx HTN, HLD, CAD/CABG (10 years ago, no stents), L eye cataract presents with left sided weakness and "disorientation," found to have HTN urgency, fever/SIRS positive without a clear source, new onset afib, and unexplained elevated lactate.
Mr. Masterson is a 72 year old male admitted on 3/17 with Afib/RVR, confusion, and elevated lactate work-up including cross sectional and physical examination was notable for giant, chronic left inguinal hernia which is asymptomatic. Patient is not currently obstructed nor demonstrates signs or symptoms of strangulated intestine. Would recommend follow-up with Dr. Kavon Thomas for elective hernia repair. Please call back with any questions or concerns.
Paronychia bilateral hallux  - Both hallux blocked with 3cc 1% lidocaine plain  - Chloro prep and sterile field set up  - consent obtained for nail abulsion  - both hallux nails avulsed with no purulence noted and no bone exposure noted.  Nail beds look healthy and blled well.  - Toes dressed with xeroform and DSD  - Pyogenic granulomal and hypertrophied skin should contract over time.   - Soak in dilute Betadine x 15' daily and dress with Bacitracin and bandage daily.
72-yo Male with PMHx HTN, HLD, CAD/CABG (10 years ago, no stents), L eye cataract presents with left sided weakness and "disorientation," found to have HTN urgency, fever/SIRS positive without a clear source, new onset afib, and unexplained elevated lactate.
72-yo Male with PMHx HTN, HLD, CAD/CABG (10 years ago, no stents), L eye cataract presents with left sided weakness and "disorientation," found to have HTN urgency, fever/SIRS positive without a clear source, new onset afib, and unexplained elevated lactate.
No

## 2024-08-12 NOTE — PROGRESS NOTE BEHAVIORAL HEALTH - AFFECT QUALITY
[Visual inspection, sensory exam] : Foot exam, including visual inspection, sensory exam with mono filament, and pulse exam, was performed within the last 12 months
[Visual inspection, sensory exam] : Foot exam, including visual inspection, sensory exam with mono filament, and pulse exam, was performed within the last 12 months
Euthymic
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
